# Patient Record
Sex: FEMALE | Race: WHITE
[De-identification: names, ages, dates, MRNs, and addresses within clinical notes are randomized per-mention and may not be internally consistent; named-entity substitution may affect disease eponyms.]

---

## 2017-03-11 ENCOUNTER — HOSPITAL ENCOUNTER (INPATIENT)
Dept: HOSPITAL 25 - ED | Age: 82
LOS: 4 days | Discharge: NURSING, REHAB OR LONG-TERM CARE | DRG: 812 | End: 2017-03-15
Attending: INTERNAL MEDICINE | Admitting: HOSPITALIST
Payer: COMMERCIAL

## 2017-03-11 DIAGNOSIS — F32.9: ICD-10-CM

## 2017-03-11 DIAGNOSIS — I10: ICD-10-CM

## 2017-03-11 DIAGNOSIS — D50.9: Primary | ICD-10-CM

## 2017-03-11 DIAGNOSIS — E03.9: ICD-10-CM

## 2017-03-11 DIAGNOSIS — R23.1: ICD-10-CM

## 2017-03-11 DIAGNOSIS — S70.01XA: ICD-10-CM

## 2017-03-11 DIAGNOSIS — S05.11XA: ICD-10-CM

## 2017-03-11 DIAGNOSIS — S05.12XA: ICD-10-CM

## 2017-03-11 DIAGNOSIS — Z87.440: ICD-10-CM

## 2017-03-11 DIAGNOSIS — F03.91: ICD-10-CM
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
## 2017-03-11 DIAGNOSIS — S50.01XA: ICD-10-CM

## 2017-03-11 DIAGNOSIS — Z91.81: ICD-10-CM

## 2017-03-11 DIAGNOSIS — D63.8: ICD-10-CM

## 2017-03-11 DIAGNOSIS — Z88.3: ICD-10-CM
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
## 2017-03-11 DIAGNOSIS — E04.1: ICD-10-CM

## 2017-03-11 LAB
ALBUMIN SERPL BCG-MCNC: 4.1 G/DL (ref 3.2–5.2)
ALP SERPL-CCNC: 46 U/L (ref 34–104)
ALT SERPL W P-5'-P-CCNC: 12 U/L (ref 7–52)
ANION GAP SERPL CALC-SCNC: 8 MMOL/L (ref 2–11)
AST SERPL-CCNC: 20 U/L (ref 13–39)
BUN SERPL-MCNC: 20 MG/DL (ref 6–24)
BUN/CREAT SERPL: 18.3 (ref 8–20)
CALCIUM SERPL-MCNC: 9.5 MG/DL (ref 8.6–10.3)
CHLORIDE SERPL-SCNC: 100 MMOL/L (ref 101–111)
GLOBULIN SER CALC-MCNC: 3.5 G/DL (ref 2–4)
GLUCOSE SERPL-MCNC: 121 MG/DL (ref 70–100)
HCO3 SERPL-SCNC: 27 MMOL/L (ref 22–32)
HCT VFR BLD AUTO: 34 % (ref 35–47)
HGB BLD-MCNC: 11.1 G/DL (ref 12–16)
MCH RBC QN AUTO: 30 PG (ref 27–31)
MCHC RBC AUTO-ENTMCNC: 33 G/DL (ref 31–36)
MCV RBC AUTO: 90 FL (ref 80–97)
POTASSIUM SERPL-SCNC: 3.9 MMOL/L (ref 3.5–5)
PROT SERPL-MCNC: 7.6 G/DL (ref 6.4–8.9)
RBC # BLD AUTO: 3.74 10^6/UL (ref 4–5.4)
SODIUM SERPL-SCNC: 135 MMOL/L (ref 133–145)
TROPONIN I SERPL-MCNC: 0.01 NG/ML (ref ?–0.04)
TSH SERPL-ACNC: 3.35 MCIU/ML (ref 0.34–5.6)
WBC # BLD AUTO: 9.4 10^3/UL (ref 3.5–10.8)

## 2017-03-11 PROCEDURE — 85025 COMPLETE CBC W/AUTO DIFF WBC: CPT

## 2017-03-11 PROCEDURE — 83550 IRON BINDING TEST: CPT

## 2017-03-11 PROCEDURE — 85018 HEMOGLOBIN: CPT

## 2017-03-11 PROCEDURE — 93005 ELECTROCARDIOGRAM TRACING: CPT

## 2017-03-11 PROCEDURE — 82607 VITAMIN B-12: CPT

## 2017-03-11 PROCEDURE — 36415 COLL VENOUS BLD VENIPUNCTURE: CPT

## 2017-03-11 PROCEDURE — 83605 ASSAY OF LACTIC ACID: CPT

## 2017-03-11 PROCEDURE — 81015 MICROSCOPIC EXAM OF URINE: CPT

## 2017-03-11 PROCEDURE — 71010: CPT

## 2017-03-11 PROCEDURE — 84443 ASSAY THYROID STIM HORMONE: CPT

## 2017-03-11 PROCEDURE — 81003 URINALYSIS AUTO W/O SCOPE: CPT

## 2017-03-11 PROCEDURE — 80053 COMPREHEN METABOLIC PANEL: CPT

## 2017-03-11 PROCEDURE — 85045 AUTOMATED RETICULOCYTE COUNT: CPT

## 2017-03-11 PROCEDURE — 82746 ASSAY OF FOLIC ACID SERUM: CPT

## 2017-03-11 PROCEDURE — 83540 ASSAY OF IRON: CPT

## 2017-03-11 PROCEDURE — 85014 HEMATOCRIT: CPT

## 2017-03-11 PROCEDURE — 82728 ASSAY OF FERRITIN: CPT

## 2017-03-11 PROCEDURE — 72125 CT NECK SPINE W/O DYE: CPT

## 2017-03-11 PROCEDURE — 87641 MR-STAPH DNA AMP PROBE: CPT

## 2017-03-11 PROCEDURE — 84484 ASSAY OF TROPONIN QUANT: CPT

## 2017-03-11 PROCEDURE — 80048 BASIC METABOLIC PNL TOTAL CA: CPT

## 2017-03-11 PROCEDURE — 70450 CT HEAD/BRAIN W/O DYE: CPT

## 2017-03-11 RX ADMIN — SENNOSIDES SCH TAB: 8.6 TABLET, FILM COATED ORAL at 21:21

## 2017-03-11 RX ADMIN — BUPROPION HYDROCHLORIDE SCH MG: 100 TABLET ORAL at 21:21

## 2017-03-11 RX ADMIN — HALOPERIDOL PRN MG: 5 TABLET ORAL at 19:42

## 2017-03-11 RX ADMIN — ENOXAPARIN SODIUM SCH MG: 40 INJECTION SUBCUTANEOUS at 18:33

## 2017-03-11 RX ADMIN — QUETIAPINE SCH MG: 25 TABLET, FILM COATED ORAL at 21:21

## 2017-03-11 RX ADMIN — DIVALPROEX SODIUM SCH MG: 125 TABLET, DELAYED RELEASE ORAL at 21:22

## 2017-03-11 RX ADMIN — ALPRAZOLAM SCH MG: 0.5 TABLET ORAL at 21:21

## 2017-03-11 RX ADMIN — ACETAMINOPHEN PRN MG: 325 TABLET ORAL at 19:42

## 2017-03-11 NOTE — RAD
Indication: Fall, altered mental status, chest pain.



Comparison: May 15, 2008 abdomen CT.



Technique: Single view chest 1412 hours



Report: Elevated lung volumes and mild prominence of the interstitial markings. No

alveolar consolidation, focal pulmonary lesion, pleural effusion, pneumothorax. The heart,

pulmonary vasculature, and mediastinal contours are unremarkable. No rib fractures

evident.



IMPRESSION: Stigmata of probable chronic obstructive pulmonary disease. No acute

cardiopulmonary process evident.

## 2017-03-11 NOTE — ED
Jeremiah GALDAMEZ Janilya, scribed for Clive Pineda MD on 03/11/17 at 1232 .





Adult Trauma





- HPI Summary


HPI Summary: 


An 84 y/o female [facility] c/o several falls in the past few days, 

including an unwitnessed fall that occurred today. Per EMS, pt reports having 

injured her head during one of her falls. In addition, now pt states she has 

increased right elbow and right hip pain. Pt resides in [facility]. When asked, 

she does state she has CP. 





- History of Current Complaint


Chief Complaint: EDExtremityLower


Stated Complaint: FAll


Time Seen by Provider: 03/11/17 12:16


Hx Obtained From: Patient


Mechanism of Injury: Fall


Loss of Consciousness: no loss of consciousness


Onset/Duration: Started Days Ago, Traumatic, Still Present


Onset of Pain: Immediate


Onset Severity: Moderate


Current Severity: Moderate


Pain Intensity: 0


Location: Head, Neck, Chest


Character: Aching


Aggravating Factor(s): Nothing


Alleviating Factor(s): Nothing


Associated Signs & Symptoms: Positive: Chest Pain





- Allergy/Home Medications


Allergies/Adverse Reactions: 


 Allergies











Allergy/AdvReac Type Severity Reaction Status Date / Time


 


Amoxicillin Allergy  Unknown Verified 03/11/16 16:05





   Reaction  





   Details  


 


Clavulanic Acid Allergy  Unknown Verified 03/11/16 16:05





[From Augmentin]   Reaction  





   Details  


 


Yellow Dye Allergy  Unknown Verified 03/11/16 16:05





   Reaction  





   Details  











Home Medications: 


 Home Medications





ALPRAZolam TAB* [Xanax TAB*] 0.5 mg PO TID 03/11/17 [History Confirmed 03/11/17]


Acetaminophen [Acetaminophen Extra Stren] 1,000 mg PO BID 03/11/17 [History 

Confirmed 03/11/17]


Aspirin [Aspirin 81 MG TAB] 81 mg PO DAILY 03/11/17 [History Confirmed 03/11/17]


Cholecalciferol [Vitamin D3] 2,000 unit PO DAILY 03/11/17 [History Confirmed 03/ 11/17]


Cyanocobalamin INJ * [Vitamin B12 INJ *] 1,000 mcg IM MONTHLY 03/11/17 [History 

Confirmed 03/11/17]


Divalproex DR TAB(*) [Depakote DR TAB(*)] 375 mg PO TID 03/11/17 [History 

Confirmed 03/11/17]


Levothyroxine TAB* [Synthroid TAB*] 50 mcg PO DAILY 03/11/17 [History Confirmed 

03/11/17]


Miralax* 17 gm PO DAILY 03/11/17 [History Confirmed 03/11/17]


QUEtiapine TAB* [Seroquel TAB*] 50 mg PO DAILY 03/11/17 [History Confirmed 03/11 /17]


QUEtiapine TAB* [Seroquel TAB*] 75 mg PO BEDTIME 03/11/17 [History Confirmed 03/ 11/17]


Senna TAB* [Senokot TAB*] 8.6 mg PO BEDTIME 03/11/17 [History Confirmed 03/11/17

]


buPROPion TAB* [Wellbutrin TAB*] 100 mg PO TID 03/11/17 [History Confirmed 03/11 /17]











PMH/Surg Hx/FS Hx/Imm Hx


Previously Healthy: Yes


Endocrine/Hematology History: Reports: Hx Thyroid Disease - HYPO


Cardiovascular History: Reports: Hx Hypertension





- Surgical History


Surgery Procedure, Year, and Place: TOTAL HYSTERECTOMY


Infectious Disease History: No


Infectious Disease History: Reports: Hx Shingles


   Denies: Traveled Outside the [place] in Last 30 Days





- Family History


Known Family History: Positive: Unknown


Family History: Pt is a poor historian.





- Social History


Occupation: Retired


Lives: [facility]


Alcohol Use: None


Substance Use Type: Reports: None


Smoking Status (MU): Never Smoked Tobacco





Review of Systems


Negative: Fever, Chills


Negative: Erythema


Negative: Sore Throat


Positive: Chest Pain


Negative: Shortness Of Breath, Cough


Negative: Abdominal Pain, Vomiting, Diarrhea, Nausea


Negative: dysuria, hematuria


Positive: Arthralgia - neck pain as well as R hip and R elbow pain, Myalgia - 

neck pain as well as R hip and R elbow pain.  Negative: Edema


Negative: Rash


Neurological: Negative - pt denies dizziness


All Other Systems Reviewed And Are Negative: Yes





Physical Exam





- Summary


Physical Exam Summary: 





Constitutional: Well-developed, Well-nourished, Alert, Cooperative, Smell of 

Urine


Skin: Warm, Dry


HENT: Normocephalic; No Racoons eyes; No battles sign; No abrasion; No contusion

; No hemotympanum; No maxilla facial tenderness or instability; Dentition are 

smooth; No dental trauma; No trismus


Eyes: EOM normal, PERRL


Neck: Trachea is midline. No stridor; No JVD; No step off; No posterior 

cervical spine tenderness


Cardio: Rhythm regular, rate normal Heart sounds normal; Intact distal pulses; 

The pedal pulses are 2+ and symmetric. Radial pulses are 2+ and symmetric.


Pulmonary/Chest wall: Effort normal; Breath sounds normal; Equal chest rise; No 

flail segment; No rib tenderness; No sternal tenderness


Abd: Soft, Appearance normal. No distension; No tenderness; No palpable 

pulsatile mass; No Cullens sign; No Grey-Turners sign


Musculoskeletal: Full ROM and no tenderness at ankles, shoulders, and knees; 

Big yellow hematoma and tenderness to palpation over right hip; Hematoma and 

tenderness over right elbow, left frontal scalp, right posterior scalp, 

cervical spine;, No joint swelling; No vertebral body tenderness; No paraspinal 

tenderness; No step off or deformity of the spine; Pelvis is stable to lateral 

compression and rock


Neuro: Alert, Oriented x3, Strength 5/5 all extremities.


: No blood at urethral meatus


Psych: Mood and affect Normal





Triage Information Reviewed: Yes


Vital Signs On Initial Exam: 


 Initial Vitals











Temp Pulse Resp BP Pulse Ox


 


 98.2 F   84   16   120/57   95 


 


 03/11/17 12:17  03/11/17 12:17  03/11/17 12:17  03/11/17 12:17  03/11/17 12:17











Vital Signs Reviewed: Yes





Diagnostics





- Vital Signs


 Vital Signs











  Temp Pulse Resp BP Pulse Ox


 


 03/11/17 12:17  98.2 F  84  16  120/57  95














- Laboratory


Result Diagrams: 


 03/11/17 12:40





 03/11/17 12:40


Lab Statement: Any lab studies that have been ordered have been reviewed, and 

results considered in the medical decision making process.





- Radiology


  ** CXR


Xray Interpretation: No Acute Changes - IMPRESSION: No acute disease


Radiology Interpretation Completed By: ED Physician - Dr. Pineda





  ** hip/pelvis


Xray Interpretation: No Acute Changes - No acute disease.


Radiology Interpretation Completed By: ED Physician





  ** elbow


Xray Interpretation: No Acute Changes - No acute disease


Radiology Interpretation Completed By: ED Physician





- CT


  ** cervical spine


CT Interpretation: No Acute Changes - IMPRESSION: Negative for traumatic 

cervical spine injury.


CT Interpretation Completed By: Radiologist





  ** brain


CT Interpretation: Positive (See Comments) - IMPRESSION:  1. Mild RIGHT 

parietal scalp swelling. Negative for fracture or traumatic brain injury. 2. 

Involutional change and stigmata of chronic microvascular disease. No acute 

intracranial process evident.


CT Interpretation Completed By: Radiologist





- EKG


  ** 1307


Cardiac Rate: NL - 84 bpm


EKG Rhythm: Sinus Rhythm


Ectopy: None


EKG Interpretation: No STEMI





Re-Evaluation





- Re-Evaluation


  ** First Eval


Re-Evaluation Time: 14:50


Change: Unchanged


Comment: Pt is agitated and yelling.





Adult Trauma Course/Dx





- Course


Assessment/Plan: An 83-year-old female presents to the ED with a CC of frequent 

falls in the past few days. Pt c/o R elbow and R hip pain, however, xrays of 

these areas are negative. EKG is normal. Pt is mildly confused and a poor 

historian. We discussed patient care with Dr. Mueller and they recommended 

admission. Patient will be admitted for UTI and contusion. Pt is agreeable with 

this plan.





- Diagnoses


Differential Diagnosis/HQI/PQRI: Positive: Contusion(s), Fracture, Dislocation, 

Hematoma(s), Sprain, Strain


Provider Diagnoses: 


 UTI (urinary tract infection), Agitation, Frequent falls, Elbow contusion, 

Contusion of hip, Scalp hematoma








- Physician Notifications


Discussed Care Of Patient With: Dr. Mueller (hospitalist) at 1502: agrees to 

admit pt.





Discharge





- Discharge Plan


Condition: Stable


Disposition: ADMITTED TO [facility] documentation as recorded by the Jeremiah remy Janilya accurately 

reflects the service I personally performed and the decisions made by me, Clive Pineda MD.

## 2017-03-11 NOTE — RAD
Indication: Fall, hematoma.



Comparison: May 15, 2008 CT.



Technique: AP pelvis and AP and crosstable lateral views RIGHT hip.



Report: The RIGHT hip is normally located. No fracture of the RIGHT proximal femur or

pelvis or pelvic joint diastases evident. Preserved hip joint spaces with only minimal

acetabular marginal osteophytosis.  Soft tissue edema within the subcutaneous tissue plane

lateral to the RIGHT hip.



IMPRESSION: 

1. Soft tissue edema within the subcutaneous tissue plane lateral to the RIGHT hip.

2. No radiographic evidence for RIGHT hip fracture. As x-rays may be negative with

nondisplaced hip fracture if there is persistent clinical concern MRI or in setting of

contraindication to MRI or limitation in emergent access to MRI CT would be suggested.

## 2017-03-11 NOTE — RAD
INDICATION: Bruising to the RIGHT elbow post fall. Preserved range of motion.



COMPARISON: None.



TECHNIQUE: AP, lateral, and oblique views RIGHT elbow.



REPORT:  Normal articular alignment. Negative for fat pad displacement to indicate

effusion. Negative for acute fracture. Internal fixation hardware at both the radius and

ulna diaphyses partially visualized. Small triceps insertion olecranon bone spur. Mild

dorsal soft tissue swelling.



IMPRESSION: Mild dorsal soft tissue swelling. Negative for joint effusion or fracture.

## 2017-03-11 NOTE — RAD
Indication: Fall, altered mental status.



Comparison: August 25, 2005 MRI.



Technique: Noncontrast CT vertex of skull through foramen magnum.



Report: Moderate prominence of the cerebral sulci and cerebellar fissures reflecting

atrophy. Unremarkable ventricles and basal cisterns. Decreased density in the

periventricular and subcortical white matter while non-specific is most likely due to

chronic microangiopathy. Negative for gray matter white matter obscuration, intra or

extra-axial hemorrhage, or mass effect. Negative for orbital hematoma.



Negative for calvarial or skull base fracture. Mild mucosal thickening at the maxillary

and ethmoid sinuses. Negative for paranasal sinus fluid levels. Clear mastoid air spaces.

Mild RIGHT parietal scalp edema. Negative for significant loculated scalp hematoma.



IMPRESSION: 

1. Mild RIGHT parietal scalp swelling. Negative for fracture or traumatic brain injury.

2. Involutional change and stigmata of chronic microvascular disease. No acute

intracranial process evident.

## 2017-03-12 RX ADMIN — DIVALPROEX SODIUM SCH MG: 125 CAPSULE ORAL at 20:26

## 2017-03-12 RX ADMIN — ENOXAPARIN SODIUM SCH MG: 40 INJECTION SUBCUTANEOUS at 15:43

## 2017-03-12 RX ADMIN — DIVALPROEX SODIUM SCH MG: 125 TABLET, DELAYED RELEASE ORAL at 07:43

## 2017-03-12 RX ADMIN — BUPROPION HYDROCHLORIDE SCH MG: 100 TABLET ORAL at 12:09

## 2017-03-12 RX ADMIN — ALPRAZOLAM SCH MG: 0.5 TABLET ORAL at 20:25

## 2017-03-12 RX ADMIN — ASPIRIN SCH MG: 81 TABLET, COATED ORAL at 07:43

## 2017-03-12 RX ADMIN — POLYETHYLENE GLYCOL 3350 SCH GM: 17 POWDER, FOR SOLUTION ORAL at 07:43

## 2017-03-12 RX ADMIN — QUETIAPINE SCH MG: 25 TABLET, FILM COATED ORAL at 20:26

## 2017-03-12 RX ADMIN — HALOPERIDOL PRN MG: 5 TABLET ORAL at 06:28

## 2017-03-12 RX ADMIN — ALPRAZOLAM SCH MG: 0.5 TABLET ORAL at 12:09

## 2017-03-12 RX ADMIN — ACETAMINOPHEN PRN MG: 325 TABLET ORAL at 06:28

## 2017-03-12 RX ADMIN — DIVALPROEX SODIUM SCH MG: 125 CAPSULE ORAL at 12:09

## 2017-03-12 RX ADMIN — QUETIAPINE SCH MG: 25 TABLET, FILM COATED ORAL at 07:42

## 2017-03-12 RX ADMIN — BUPROPION HYDROCHLORIDE SCH MG: 100 TABLET ORAL at 20:26

## 2017-03-12 RX ADMIN — ALPRAZOLAM SCH MG: 0.5 TABLET ORAL at 07:43

## 2017-03-12 RX ADMIN — SENNOSIDES SCH TAB: 8.6 TABLET, FILM COATED ORAL at 20:26

## 2017-03-12 RX ADMIN — BUPROPION HYDROCHLORIDE SCH MG: 100 TABLET ORAL at 07:43

## 2017-03-12 RX ADMIN — LEVOTHYROXINE SODIUM SCH MCG: 50 TABLET ORAL at 05:48

## 2017-03-12 RX ADMIN — CEFTRIAXONE SODIUM SCH MLS/HR: 1 INJECTION, POWDER, FOR SOLUTION INTRAMUSCULAR; INTRAVENOUS at 15:43

## 2017-03-12 NOTE — PN
Subjective


Date of Service: 03/12/17


Interval History: 





Patient seen this morning. She remains confused, she is more alert and makes 

eye contact. Mostly mumbles. Some pain in RLE. Did not eat much breakfast. 


Family History: Unchanged from Admission


Social History: Unchanged from Admission


Past Medical History: Unchanged from Admission





Objective


Active Medications: 








Acetaminophen (Tylenol Tab*)  650 mg PO Q4H PRN


Alprazolam (Xanax Tab*)  0.5 mg PO TID [facility]


Aspirin (Aspirin Ec Low Dose*)  81 mg PO DAILY JERRICA


Bupropion HCl (Wellbutrin Tab*)  100 mg PO TID JERRICA


Cyanocobalamin (Vitamin B12 Inj *)  1,000 mcg IM MONTHLY JERRICA


Divalproex Sodium (Depakote Dr Tab(*))  375 mg PO TID JERRICA


Enoxaparin Sodium (Lovenox(*))  40 mg SUBCUT Q24H JERRICA


Haloperidol (Haldol Tab*)  5 mg PO Q6H PRN


Sodium Chloride (Ns 0.9% 1000 Ml*)  1,000 mls @ 75 mls/hr IV PER RATE JERRICA


Ceftriaxone Sodium 1,000 mg/ (Sodium Chloride)  50 mls @ 200 mls/hr IVPB Q24H 

JERRICA


Levothyroxine Sodium (Synthroid Tab*)  50 mcg PO DAILY@0600 JERRICA


Nystatin (Nystatin Top Powder*)  1 applic TOPICAL Q2H PRN


Polyethylene Glycol/Electrolytes (Miralax*)  17 gm PO DAILY [facility]


Quetiapine Fumarate (Seroquel Tab*)  75 mg PO BEDTIME JERRICA


Quetiapine Fumarate (Seroquel Tab*)  50 mg PO DAILY [facility]


Senna (Senokot Tab*)  1 tab PO BEDTIME [facility]





 Vital Signs











  03/11/17 03/11/17 03/11/17





  15:45 16:00 16:22


 


Temperature 98.1 F  


 


Pulse Rate 75 85 83


 


Respiratory 16  





Rate   


 


Blood Pressure 130/92  143/93





(mmHg)   


 


O2 Sat by Pulse 99 93 95





Oximetry   














  03/12/17 03/12/17 03/12/17





  07:43 08:17 08:55


 


Temperature  97.1 F 


 


Pulse Rate  94 


 


Respiratory 16  18





Rate   


 


Blood Pressure  134/64 





(mmHg)   


 


O2 Sat by Pulse  95 





Oximetry   











Oxygen Devices in Use Now: None


Appearance: Elderly,  F, laying in bed, slightly restless


Eyes: No Scleral Icterus, - - Raccoon eyes


Ears/Nose/Mouth/Throat: - - Dry Mm


Neck: NL Appearance and Movements; NL JVP


Respiratory: Symmetrical Chest Expansion and Respiratory Effort, Clear to 

Auscultation


Cardiovascular: NL Sounds; No Murmurs; No JVD, - - Tachycardic


Abdominal: NL Sounds; No Tenderness; No Distention


Lymphatic: No Cervical Adenopathy


Extremities: - - R thigh with ecchymosis and edema, TTP 


Neurological: - - Alert, confused, mumbles


Lines/Tubes/Other Access: Clean, Dry and Intact Nicole


Result Diagrams: 


 03/11/17 12:40





 03/11/17 12:40


Microbiology and Other Data: 


 Microbiology











 03/12/17 04:00 Nasal Screen MRSA (PCR)(BRIAN) - Final





 Nasal    Mrsa Negative














Assess/Plan/Problems-Billing


Assessment: 


Recurrent falls and acute on chronic dementia 2/2 UTI in an 82 yo F with hx of 

HTN, depression, hypothyroidism








- Patient Problems


(1) UTI (urinary tract infection)


Current Visit: Yes   Comment: Continue IV CTX and light IVF. Mental status 

seems improved today. Remove nicole. Awaiting culture results. Will try to 

ambulate patient today.    





(2) Dementia


Current Visit: Yes   Comment: Continue prn Haldol and home medications   





(3) Hypothyroidism


Current Visit: Yes   Comment: Continue synthroid   





(4) Thyroid cyst


Current Visit: Yes   Comment: TSH normal. Further workup as outpatient   





(5) Depression


Current Visit: Yes   Comment: Continue home medications   





(6) DVT prophylaxis


Current Visit: Yes   Comment: Lovenox

## 2017-03-12 NOTE — HP
CC:  Dr. Filiberto Mi

 

HISTORY AND PHYSICAL:

 

DATE OF ADMISSION:  03/11/17

 

PRIMARY CARE PHYSICIAN:  Dr. Filiberto Mi.

 

CHIEF COMPLAINT:  Recurrent falls.

 

HISTORY OF PRESENT ILLNESS:  Ms. Cash is an 83-year-old female with a past medical history of hype
rtension, dementia with behavioral disturbances, depression, hypothyroidism, recurrent UTIs, who pre
sents to the hospital from [facility] where she lives with recurrent falls.  The patient has signific
ant dementia and also just received Ativan and is unable to contribute to history at this time.  The
 patient's daughter is present.  History obtained from the daughter and the notes from [facility].  S
he said the patient has had progressive falls over the past 24 hours. Her daughter states that she h
as moved to [facility] back in November after she was at [place].  Her daughter has overall not been p
leased with the care at [facility] and feels that the staff did not pay enough attention to her mothe
r.  She is often found lying on the ground in her room; however, this has been happening more freque
ntly and now 3 times in the past 24 hours.  Yesterday, the daughter was called when the patient was 
found on the floor in her room.  She was complaining of some hip pain.  She underwent an x-ray of th
e hip that was negative.  Later on the day, the daughter was called again that the patient was found
 on the ground and then again this morning.  She has been having significant bruises from these fall
s, although I am not clear if any significant fall has been witnessed.  It sounds like the patient carolyn medrano slides down to the ground; however, due to her recurrent falls and bruising, the patient was s
ent to the hospital for further evaluation.

 

The daughter states that the patient has fairly advanced dementia at this point. She states she stil
l will recognize family.  She is not verbal for the most part and mostly mumbles.  She is fairly agg
ressive and has been for some time now.  The patient ambulates without any assistance and does not u
se a cane or a walker. Daughter states that the patient is prone to urinary tract infections.

 

PAST MEDICAL HISTORY:  Hypertension, depression, dementia, hypothyroidism, UTIs.

 

PAST SURGICAL HISTORY:  Hysterectomy, arm surgery.

 

ALLERGIES:  AMOXICILLIN, CLAVULANIC ACID, YELLOW DYE.

 

FAMILY HISTORY:  Significant for two brothers with CAD.  Mother with cancer. Sister with breast canc
er.

 

SOCIAL HISTORY:  No history of drug use.  No alcohol use.  No tobacco use.  The patient is a residen
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

 

REVIEW OF SYSTEMS:  Unable to obtain from the patient.

 

                               PHYSICAL EXAMINATION

 

GENERAL:  The patient is an elderly  female lying in bed, in no apparent distress, somewhat
 lethargic.

 

VITAL SIGNS:  On admission, temperature 98.2, heart rate of 84, respiratory rate of 16, O2 saturatio
n 95% on room air, blood pressure 120/57.

 

HEENT:  The patient has significant bruising on the left forehead and raccoon eyes. Refused to open 
her mouth.

 

NECK:  No cervical adenopathy noted.

 

LUNGS:  Clear to auscultation in anterior fields.  No wheezes, rales, or rhonchi.

 

CARDIOVASCULAR:  Regular rate and rhythm.  S1, S2 present.  No murmurs, gallops, or rubs.

 

ABDOMEN:  Soft, nontender, nondistended.  Bowel sounds positive.

 

EXTREMITIES:  No cyanosis, clubbing, or edema.  The patient with some ecchymoses on the right mid th
igh which is tender to palpation.

 

NEURO:  The patient is lethargic.  Her answers are incomprehensible.

 

 DIAGNOSTIC STUDIES/LAB DATA:  White blood cell count of 9.4, hematocrit of 34, platelets of 193.  S
odium of 135, potassium 3.9, chloride of 100, carbon dioxide 27, BUN of 20, creatinine is 1.09, gluc
ose of 121, lactic acid of 1.9.  LFTs within normal limits.  Troponin 0.01.  UA with 2+ protein, tra
ce ketones, 1+ blood, 3+ wbc's, 1+ rbc's, squamous epithelial cells present.

 

CT of the brain showed some right-sided scalp swelling; however, no intracranial changes.

 

CT of the C-spine shows no injury.

 

Chest x-ray personally reviewed shows some hyperinflated lungs, no focal consolidations.

 

Elbow x-ray shows dorsal soft tissue swelling.

 

Hip and pelvis x-ray shows soft tissue edema, no fractures.

 

EKG shows normal sinus rhythm with a QTc of 462.

 

ASSESSMENT AND PLAN:  Recurrent falls and possible exacerbation of the patient's underlying aggressi
ve dementia in an 83-year-old female with a past medical history of hypertension, depression, hypoth
yroidism, and recurrent urinary tract infections.

 

1.  Recurrent falls and aggressive behavior, maybe secondary to urinary tract infection.  Daughter s
tates the patient is prone to this.  Unable to get an adequate history of any urinary symptoms from 
the patient.  She received ceftriaxone in the emergency department.  We will continue this at 1 g da
nikita for now.  Continue the patient's home Seroquel and we will also add Haldol p.r.n. for agitation.
  The patient has numerous ecchymoses; however, no fractures are noted on imaging.  We will hold on 
any additional imaging for now.  We will continue IV fluids at 100 cc per hour.

2.  Depression:  Continue home Wellbutrin and Depakote.

3.  Hypothyroidism:  Continue home Synthroid.

4.  History of thyroid cyst:  Daughter states the patient was scheduled to have an ultrasound soon o
f her thyroid to follow up on a cyst.  This can be worked up as an outpatient.  I will add a TSH on 
to the patient's labs to see if she needs any adjustment in her medication.

5.  DVT prophylaxis:  Lovenox subcu.

6.  Code status:  The patient is a full code as per [facility] form.

 

TIME SPENT:  Total time spent on this admission 45 minutes, more than half the time spent face-to-fa
ce with the patient in counseling and coordinating care.

 

 

 

[id number]/[facility] #: [id number]

## 2017-03-13 LAB
ANION GAP SERPL CALC-SCNC: 6 MMOL/L (ref 2–11)
BUN SERPL-MCNC: 13 MG/DL (ref 6–24)
BUN/CREAT SERPL: 14.8 (ref 8–20)
CALCIUM SERPL-MCNC: 8.3 MG/DL (ref 8.6–10.3)
CHLORIDE SERPL-SCNC: 107 MMOL/L (ref 101–111)
GLUCOSE SERPL-MCNC: 80 MG/DL (ref 70–100)
HCO3 SERPL-SCNC: 22 MMOL/L (ref 22–32)
HCT VFR BLD AUTO: 27 % (ref 35–47)
HGB BLD-MCNC: 9 G/DL (ref 12–16)
POTASSIUM SERPL-SCNC: 3.8 MMOL/L (ref 3.5–5)
SODIUM SERPL-SCNC: 135 MMOL/L (ref 133–145)

## 2017-03-13 RX ADMIN — BUPROPION HYDROCHLORIDE SCH MG: 100 TABLET ORAL at 21:27

## 2017-03-13 RX ADMIN — QUETIAPINE SCH MG: 25 TABLET, FILM COATED ORAL at 21:27

## 2017-03-13 RX ADMIN — DIVALPROEX SODIUM SCH MG: 125 CAPSULE ORAL at 21:27

## 2017-03-13 RX ADMIN — DIVALPROEX SODIUM SCH MG: 125 CAPSULE ORAL at 09:07

## 2017-03-13 RX ADMIN — ASPIRIN SCH MG: 81 TABLET, COATED ORAL at 09:06

## 2017-03-13 RX ADMIN — QUETIAPINE SCH MG: 25 TABLET, FILM COATED ORAL at 09:04

## 2017-03-13 RX ADMIN — CEFTRIAXONE SODIUM SCH MLS/HR: 1 INJECTION, POWDER, FOR SOLUTION INTRAMUSCULAR; INTRAVENOUS at 15:08

## 2017-03-13 RX ADMIN — LEVOTHYROXINE SODIUM SCH MCG: 50 TABLET ORAL at 06:02

## 2017-03-13 RX ADMIN — SENNOSIDES SCH TAB: 8.6 TABLET, FILM COATED ORAL at 21:27

## 2017-03-13 RX ADMIN — DIVALPROEX SODIUM SCH MG: 125 CAPSULE ORAL at 15:09

## 2017-03-13 RX ADMIN — BUPROPION HYDROCHLORIDE SCH MG: 100 TABLET ORAL at 09:06

## 2017-03-13 RX ADMIN — POLYETHYLENE GLYCOL 3350 SCH GM: 17 POWDER, FOR SOLUTION ORAL at 09:06

## 2017-03-13 RX ADMIN — ALPRAZOLAM SCH MG: 0.5 TABLET ORAL at 09:05

## 2017-03-13 RX ADMIN — BUPROPION HYDROCHLORIDE SCH MG: 100 TABLET ORAL at 15:08

## 2017-03-13 RX ADMIN — ALPRAZOLAM SCH MG: 0.5 TABLET ORAL at 15:09

## 2017-03-13 RX ADMIN — ENOXAPARIN SODIUM SCH MG: 40 INJECTION SUBCUTANEOUS at 17:06

## 2017-03-13 RX ADMIN — ALPRAZOLAM SCH MG: 0.5 TABLET ORAL at 21:27

## 2017-03-13 NOTE — PN
Subjective


Date of Service: 03/13/17


Interval History: 





Patient seen and examined at bedside. Patient was very sleepy but arousable. 

She mostly mumbles and swats my hands away during the assessment. I cannot 

elicit any complaints from her. Nursing expressed concern that the patient had 

periods of hypotension overnight and appeared more pale. 








Family History: Unchanged from Admission


Social History: Unchanged from Admission


Past Medical History: Unchanged from Admission





Objective


Active Medications: 








Acetaminophen (Tylenol Tab*)  650 mg PO Q4H PRN


   PRN Reason: FEVER/PAIN


   Last Admin: 03/11/17 19:42 Dose:  650 mg


Alprazolam (Xanax Tab*)  0.5 mg PO TID [facility]


   Last Admin: 03/13/17 09:05 Dose:  0.5 mg


Aspirin (Aspirin Ec Low Dose*)  81 mg PO DAILY [facility]


   Last Admin: 03/13/17 09:06 Dose:  81 mg


Bupropion HCl (Wellbutrin Tab*)  100 mg PO TID [facility]


   Last Admin: 03/13/17 09:06 Dose:  100 mg


Cyanocobalamin (Vitamin B12 Inj *)  1,000 mcg IM MONTHLY [facility]


Divalproex Sodium (Depakote Sprinkle Cap*)  375 mg PO TID [facility]


   Last Admin: 03/13/17 09:07 Dose:  375 mg


Enoxaparin Sodium (Lovenox(*))  40 mg SUBCUT Q24H [facility]


   Last Admin: 03/12/17 15:43 Dose:  40 mg


Haloperidol (Haldol Tab*)  5 mg PO Q6H PRN


   PRN Reason: AGITATION


   Last Admin: 03/11/17 19:42 Dose:  5 mg


Sodium Chloride (Ns 0.9% 1000 Ml*)  1,000 mls @ 75 mls/hr IV PER RATE [facility]


   Last Admin: 03/12/17 07:56 Dose:  75 mls/hr


Ceftriaxone Sodium 1,000 mg/ (Sodium Chloride)  50 mls @ 200 mls/hr IVPB Q24H 

[facility]


   Last Admin: 03/12/17 15:43 Dose:  200 mls/hr


Levothyroxine Sodium (Synthroid Tab*)  50 mcg PO DAILY@0600 [facility]


   Last Admin: 03/13/17 06:02 Dose:  50 mcg


Nystatin (Nystatin Top Powder*)  1 applic TOPICAL Q2H PRN


   PRN Reason: RASH


Polyethylene Glycol/Electrolytes (Miralax*)  17 gm PO DAILY [facility]


   Last Admin: 03/13/17 09:06 Dose:  17 gm


Quetiapine Fumarate (Seroquel Tab*)  75 mg PO BEDTIME [facility]


   Last Admin: 03/12/17 20:26 Dose:  75 mg


Quetiapine Fumarate (Seroquel Tab*)  50 mg PO DAILY [facility]


   Last Admin: 03/13/17 09:04 Dose:  50 mg


Senna (Senokot Tab*)  1 tab PO BEDTIME [facility]


   Last Admin: 03/12/17 20:26 Dose:  1 tab








 Vital Signs











  03/12/17 03/12/17 03/12/17





  11:48 12:09 14:09


 


Temperature 99.3 F  


 


Pulse Rate 87  


 


Respiratory  16 18





Rate   


 


Blood Pressure 117/58  





(mmHg)   


 


O2 Sat by Pulse 93  





Oximetry   














  03/12/17 03/12/17 03/12/17





  15:40 20:00 20:25


 


Temperature 97.2 F  


 


Pulse Rate 104  


 


Respiratory 16 20 12





Rate   


 


Blood Pressure 152/63  





(mmHg)   


 


O2 Sat by Pulse 92  





Oximetry   














  03/12/17 03/12/17 03/12/17





  21:51 21:58 22:25


 


Temperature 98.6 F  


 


Pulse Rate 101 103 


 


Respiratory 16 16 20





Rate   


 


Blood Pressure 79/38 95/37 





(mmHg)   


 


O2 Sat by Pulse 91 92 





Oximetry   














  03/12/17 03/13/17 03/13/17





  22:59 00:20 03:11


 


Temperature   98.8 F


 


Pulse Rate 94 89 87


 


Respiratory 22 20 16





Rate   


 


Blood Pressure 98/42 100/49 132/56





(mmHg)   


 


O2 Sat by Pulse 93 93 95





Oximetry   














  03/13/17 03/13/17 03/13/17





  03:25 07:39 09:05


 


Temperature  97.8 F 


 


Pulse Rate 84 78 


 


Respiratory 18 16 24





Rate   


 


Blood Pressure 125/54 114/62 





(mmHg)   


 


O2 Sat by Pulse 92 91 





Oximetry   











Oxygen Devices in Use Now: None


Appearance: Elderly female, lying in bed, in NAD


Eyes: No Scleral Icterus, - - bilateral periorbital ecchymosis 


Ears/Nose/Mouth/Throat: Clear Oropharnyx


Neck: NL Appearance and Movements; NL JVP


Respiratory: Symmetrical Chest Expansion and Respiratory Effort, Clear to 

Auscultation


Cardiovascular: NL Sounds; No Murmurs; No JVD, RRR


Abdominal: NL Sounds; No Tenderness; No Distention


Extremities: - - right thigh ecchymosis


Skin: - - with some pallor - unsure of baseline


Neurological: - - oriented to self


Nutrition: Taking PO's


Result Diagrams: 


 03/13/17 13:29





 03/13/17 13:29


Microbiology and Other Data: 


 Microbiology











 03/12/17 04:00 Nasal Screen MRSA (PCR)(BRIAN) - Final





 Nasal    Mrsa Negative














Assess/Plan/Problems-Billing


Assessment: 


Recurrent falls and acute on chronic dementia 2/2 UTI in an 82 yo F with hx of 

HTN, depression, hypothyroidism








- Patient Problems


(1) UTI (urinary tract infection)


Comment: 


Continue IV CTX and light IVF. 


Awaiting culture results. Encourage OOB.   





(2) Dementia


Code(s): F03.90 - UNSPECIFIED DEMENTIA WITHOUT BEHAVIORAL DISTURBANCE   Comment

: 


Continue prn Haldol and home medications   





(3) Pallor


Code(s): R23.1 - PALLOR   Comment: 


Unsure of baseline. Previous hypotension, now normotensive.


Will check HH, stool occult.


Recheck CBC in AM.    





(4) Hypothyroidism


Code(s): E03.9 - HYPOTHYROIDISM, UNSPECIFIED   Comment: 


Continue synthroid   





(5) Thyroid cyst


Code(s): E04.1 - NONTOXIC SINGLE THYROID NODULE   Comment: 


TSH normal. Further workup as outpatient   





(6) Depression


Code(s): F32.9 - MAJOR DEPRESSIVE DISORDER, SINGLE EPISODE, UNSPECIFIED   

Comment: 


Continue home medications   





(7) DVT prophylaxis


Code(s): WHS0655 -    Comment: 


Lovenox   


Status and Disposition: 





Inpatient admission. VSS and patient more alert later in day. Plan to stop IVF 

and try to ambulate pt in AM. Recheck CBC. Anticipate d/c tomorrow.

## 2017-03-14 LAB
FERRITIN SERPL IA-MCNC: 144.3 NG/ML (ref 11–307)
FOLATE SERPL-MCNC: 14.74 NG/ML (ref 3.99–?)
HCT VFR BLD AUTO: 26 % (ref 35–47)
HGB BLD-MCNC: 8.9 G/DL (ref 12–16)
IRON SERPL-MCNC: < 15 UG/DL (ref 50–212)
MATURATION FACTOR RETIC: 2
MCH RBC QN AUTO: 30 PG (ref 27–31)
MCHC RBC AUTO-ENTMCNC: 34 G/DL (ref 31–36)
MCV RBC AUTO: 90 FL (ref 80–97)
RBC # BLD AUTO: 2.94 10^6/UL (ref 4–5.4)
RETICULOCYTE PRODUCTION INDEX: 1.1 % (ref 0.5–1.5)
TIBC SERPL-MCNC: 276 MCG/DL (ref 250–450)
TRANSFERRIN SERPL-MCNC: 197 MG/DL (ref 203–362)
VIT B12 SERPL-MCNC: 698 PG/ML (ref 180–914)
WBC # BLD AUTO: 9.1 10^3/UL (ref 3.5–10.8)

## 2017-03-14 RX ADMIN — BUPROPION HYDROCHLORIDE SCH MG: 100 TABLET ORAL at 09:33

## 2017-03-14 RX ADMIN — LEVOTHYROXINE SODIUM SCH MCG: 50 TABLET ORAL at 06:18

## 2017-03-14 RX ADMIN — BUPROPION HYDROCHLORIDE SCH MG: 100 TABLET ORAL at 14:54

## 2017-03-14 RX ADMIN — SENNOSIDES SCH TAB: 8.6 TABLET, FILM COATED ORAL at 21:47

## 2017-03-14 RX ADMIN — ENOXAPARIN SODIUM SCH MG: 40 INJECTION SUBCUTANEOUS at 15:30

## 2017-03-14 RX ADMIN — GUAIFENESIN SCH MG: 600 TABLET, EXTENDED RELEASE ORAL at 21:48

## 2017-03-14 RX ADMIN — GUAIFENESIN SCH: 600 TABLET, EXTENDED RELEASE ORAL at 13:36

## 2017-03-14 RX ADMIN — ALPRAZOLAM SCH: 0.5 TABLET ORAL at 09:27

## 2017-03-14 RX ADMIN — BUPROPION HYDROCHLORIDE SCH MG: 100 TABLET ORAL at 21:47

## 2017-03-14 RX ADMIN — QUETIAPINE SCH MG: 25 TABLET, FILM COATED ORAL at 21:47

## 2017-03-14 RX ADMIN — CEFTRIAXONE SODIUM SCH MLS/HR: 1 INJECTION, POWDER, FOR SOLUTION INTRAMUSCULAR; INTRAVENOUS at 15:26

## 2017-03-14 RX ADMIN — ASPIRIN SCH MG: 81 TABLET, COATED ORAL at 09:33

## 2017-03-14 RX ADMIN — QUETIAPINE SCH MG: 25 TABLET, FILM COATED ORAL at 09:33

## 2017-03-14 RX ADMIN — DIVALPROEX SODIUM SCH MG: 125 CAPSULE ORAL at 09:33

## 2017-03-14 RX ADMIN — DIVALPROEX SODIUM SCH MG: 125 CAPSULE ORAL at 21:47

## 2017-03-14 RX ADMIN — POLYETHYLENE GLYCOL 3350 SCH GM: 17 POWDER, FOR SOLUTION ORAL at 09:33

## 2017-03-14 RX ADMIN — DIVALPROEX SODIUM SCH MG: 125 CAPSULE ORAL at 14:46

## 2017-03-14 NOTE — PN
Subjective


Date of Service: 03/14/17


Interval History: 





Patient seen and examined at bedside. Patient remains sleepy but arousable. I 

did attempt to obtain a stool specimen but there is no stool available for 

sample in the rectal vault. Patient was able to push herself up and bear weight 

on her legs with nursing assist. No other verbalized complaints. Patient is 

minimally verbal at baseline. 





I spoke with patient's daughter, Paige, who reports that the patient does 

ambulate without a walker. However, the patient often hunches over and does not 

stand up straight when she walks. It is unclear if her falls have been 

mechanical in nature or if the patient simply becomes fatigued and falls. 

Patient has not been cooperative with staff here to stand or transfer. 








Family History: Unchanged from Admission


Social History: Unchanged from Admission


Past Medical History: Unchanged from Admission





Objective


Active Medications: 








Acetaminophen (Tylenol Tab*)  650 mg PO Q4H PRN


   PRN Reason: FEVER/PAIN


   Last Admin: 03/11/17 19:42 Dose:  650 mg


Alprazolam (Xanax Tab*)  0.5 mg PO TID [facility]


   PRN Reason: AGITATION/ANXIETY


Aspirin (Aspirin Ec Low Dose*)  81 mg PO DAILY [facility]


   Last Admin: 03/14/17 09:33 Dose:  81 mg


Bupropion HCl (Wellbutrin Tab*)  100 mg PO TID [facility]


   Last Admin: 03/13/17 21:27 Dose:  100 mg


Cyanocobalamin (Vitamin B12 Inj *)  1,000 mcg IM MONTHLY [facility]


Divalproex Sodium (Depakote Sprinkle Cap*)  375 mg PO TID [facility]


   Last Admin: 03/13/17 21:27 Dose:  375 mg


Enoxaparin Sodium (Lovenox(*))  40 mg SUBCUT Q24H [facility]


   Last Admin: 03/13/17 17:06 Dose:  40 mg


Guaifenesin (Mucinex*)  1,200 mg PO BID [facility]


Haloperidol (Haldol Tab*)  5 mg PO Q6H PRN


   PRN Reason: AGITATION


   Last Admin: 03/11/17 19:42 Dose:  5 mg


Ceftriaxone Sodium 1,000 mg/ (Sodium Chloride)  50 mls @ 200 mls/hr IVPB Q24H 

[facility]


   Last Admin: 03/13/17 15:08 Dose:  200 mls/hr


Levothyroxine Sodium (Synthroid Tab*)  50 mcg PO DAILY@0600 [facility]


   Last Admin: 03/14/17 06:18 Dose:  50 mcg


Nystatin (Nystatin Top Powder*)  1 applic TOPICAL Q2H PRN


   PRN Reason: RASH


Polyethylene Glycol/Electrolytes (Miralax*)  17 gm PO DAILY [facility]


   Last Admin: 03/13/17 09:06 Dose:  17 gm


Quetiapine Fumarate (Seroquel Tab*)  75 mg PO BEDTIME [facility]


   Last Admin: 03/13/17 21:27 Dose:  75 mg


Quetiapine Fumarate (Seroquel Tab*)  50 mg PO DAILY [facility]


   Last Admin: 03/14/17 09:33 Dose:  50 mg


Senna (Senokot Tab*)  1 tab PO BEDTIME [facility]


   Last Admin: 03/13/17 21:27 Dose:  1 tab








 Vital Signs











  03/13/17 03/13/17 03/13/17





  11:05 11:20 15:09


 


Temperature  97.5 F 


 


Pulse Rate  84 


 


Respiratory 16 15 16





Rate   


 


Blood Pressure  110/50 





(mmHg)   


 


O2 Sat by Pulse  89 





Oximetry   














  03/13/17 03/13/17 03/13/17





  15:32 16:45 17:09


 


Temperature  97.3 F 


 


Pulse Rate 82  


 


Respiratory 20  16





Rate   


 


Blood Pressure 138/66  





(mmHg)   


 


O2 Sat by Pulse 95  





Oximetry   














  03/13/17 03/13/17 03/13/17





  19:52 20:00 21:27


 


Temperature 99.8 F  


 


Pulse Rate 91  


 


Respiratory 20 18 20





Rate   


 


Blood Pressure 138/62  





(mmHg)   


 


O2 Sat by Pulse 93  





Oximetry   














  03/13/17 03/13/17 03/14/17





  23:16 23:27 09:27


 


Temperature   


 


Pulse Rate 93  


 


Respiratory 16 16 16





Rate   


 


Blood Pressure 127/49  





(mmHg)   


 


O2 Sat by Pulse 92  





Oximetry   











Oxygen Devices in Use Now: None


Appearance: Elderly female, OOB to chair, in NAD, sleepy but arousable.


Eyes: No Scleral Icterus - periorbital ecchymosis


Ears/Nose/Mouth/Throat: Clear Oropharnyx


Neck: NL Appearance and Movements; NL JVP


Respiratory: Symmetrical Chest Expansion and Respiratory Effort, Clear to 

Auscultation - diminished


Cardiovascular: NL Sounds; No Murmurs; No JVD, RRR


Abdominal: NL Sounds; No Tenderness; No Distention


Extremities: No Edema


Skin: - - ecchymosis to face, extremities s/p fall


Neurological: - - unable to determine, patient does not always follow commands, 

hx dementia


Lines/Tubes/Other Access: Clean, Dry and Intact Peripheral IV


Nutrition: Taking PO's


Result Diagrams: 


 03/14/17 06:15





 03/13/17 13:29


Microbiology and Other Data: 


 Microbiology











 03/12/17 04:00 Nasal Screen MRSA (PCR)(BRIAN) - Final





 Nasal    Mrsa Negative














Assess/Plan/Problems-Billing


Assessment: 


Recurrent falls and acute on chronic dementia 2/2 UTI in an 84 yo F with hx of 

HTN, depression, hypothyroidism








- Patient Problems


(1) Anemia


Code(s): D64.9 - ANEMIA, UNSPECIFIED   Comment: 


HH dropped from 11 to 9, suspect that this may be in part dilutional


Unable to obtain stool occult, as no stool was present in rectal vault.


Obtain iron studies, B12, folate. If normal, will send to [facility] and 

recommend outpatient workup and follow-up.


VSS and no s/s of bleeding, though patient does have multiple ecchymotic areas 

from fall.   





(2) Physical deconditioning


Code(s): R53.81 - OTHER MALAISE   Comment: 


Multiple falls in previous weeks, per patient's daughters. 


Patient may benefit from assistive devices.


PT consult - if patient willing to work with therapists.


Recommend continued rehabilitation services at [facility].


Patient is able to stand and bear weight on both legs.   





(3) Dementia


Code(s): F03.90 - UNSPECIFIED DEMENTIA WITHOUT BEHAVIORAL DISTURBANCE   Comment

: 


Continue prn Haldol and home medications   





(4) Hypothyroidism


Code(s): E03.9 - HYPOTHYROIDISM, UNSPECIFIED   Comment: 


Continue synthroid   





(5) Thyroid cyst


Code(s): E04.1 - NONTOXIC SINGLE THYROID NODULE   Comment: 


TSH normal. Further workup as outpatient   





(6) Depression


Code(s): F32.9 - MAJOR DEPRESSIVE DISORDER, SINGLE EPISODE, UNSPECIFIED   

Comment: 


Continue home medications   





(7) DVT prophylaxis


Code(s): [id number] -    Comment: 


Lovenox   


Status and Disposition: 





Inpatient admission. Anticipate d/c tomorrow.

## 2017-03-15 VITALS — DIASTOLIC BLOOD PRESSURE: 55 MMHG | SYSTOLIC BLOOD PRESSURE: 145 MMHG

## 2017-03-15 RX ADMIN — BUPROPION HYDROCHLORIDE SCH MG: 100 TABLET ORAL at 09:02

## 2017-03-15 RX ADMIN — LEVOTHYROXINE SODIUM SCH MCG: 50 TABLET ORAL at 05:29

## 2017-03-15 RX ADMIN — ASPIRIN SCH MG: 81 TABLET, COATED ORAL at 09:01

## 2017-03-15 RX ADMIN — DIVALPROEX SODIUM SCH MG: 125 CAPSULE ORAL at 09:01

## 2017-03-15 RX ADMIN — GUAIFENESIN SCH MG: 600 TABLET, EXTENDED RELEASE ORAL at 09:01

## 2017-03-15 RX ADMIN — POLYETHYLENE GLYCOL 3350 SCH GM: 17 POWDER, FOR SOLUTION ORAL at 09:02

## 2017-03-15 RX ADMIN — QUETIAPINE SCH MG: 25 TABLET, FILM COATED ORAL at 09:02

## 2017-03-15 RX ADMIN — POLYMYXIN B SULFATE AND TRIMETHOPRIM SULFATE SCH: 10000; 1 SOLUTION/ DROPS OPHTHALMIC at 12:05

## 2017-03-15 RX ADMIN — POLYMYXIN B SULFATE AND TRIMETHOPRIM SULFATE SCH DROP: 10000; 1 SOLUTION/ DROPS OPHTHALMIC at 09:03

## 2017-03-15 NOTE — PN
Subjective


Date of Service: 03/15/17


Interval History: 





Patient seen and examined at bedside. Sheela is more interactive this morning. 

When I ask her to open her eyes, she states, "Can I do that?" She states "I don'

t think so" when I ask if anything is bothering her. Nursing reports patient 

had more alert periods overnight and this morning following the change to her 

alprazolam schedule. No new concerns.








Family History: Unchanged from Admission


Social History: Unchanged from Admission


Past Medical History: Unchanged from Admission





Objective


Active Medications: 








Acetaminophen (Tylenol Tab*)  650 mg PO Q4H PRN


   PRN Reason: FEVER/PAIN


   Last Admin: 03/11/17 19:42 Dose:  650 mg


Alprazolam (Xanax Tab*)  0.5 mg PO TID [facility]


   PRN Reason: AGITATION/ANXIETY


   Last Admin: 03/15/17 02:04 Dose:  0.5 mg


Aspirin (Aspirin Ec Low Dose*)  81 mg PO DAILY [facility]


   Last Admin: 03/14/17 09:33 Dose:  81 mg


Bupropion HCl (Wellbutrin Tab*)  100 mg PO TID [facility]


   Last Admin: 03/14/17 21:47 Dose:  100 mg


Cyanocobalamin (Vitamin B12 Inj *)  1,000 mcg IM MONTHLY [facility]


Divalproex Sodium (Depakote Sprinkle Cap*)  375 mg PO TID [facility]


   Last Admin: 03/14/17 21:47 Dose:  375 mg


Enoxaparin Sodium (Lovenox(*))  40 mg SUBCUT Q24H [facility]


   Last Admin: 03/14/17 15:30 Dose:  40 mg


Ferrous Sulfate (Ferrous Sulfate Tab*)  325 mg PO BID WITH MEALS [facility]


Guaifenesin (Mucinex*)  1,200 mg PO BID [facility]


   Last Admin: 03/14/17 21:48 Dose:  1,200 mg


Haloperidol (Haldol Tab*)  5 mg PO Q6H PRN


   PRN Reason: AGITATION


   Last Admin: 03/11/17 19:42 Dose:  5 mg


Levothyroxine Sodium (Synthroid Tab*)  50 mcg PO DAILY@0600 [facility]


   Last Admin: 03/15/17 05:29 Dose:  50 mcg


Nystatin (Nystatin Top Powder*)  1 applic TOPICAL Q2H PRN


   PRN Reason: RASH


Polyethylene Glycol/Electrolytes (Miralax*)  17 gm PO DAILY [facility]


   Last Admin: 03/14/17 09:33 Dose:  17 gm


Polymyxin/Trimethoprim Sulfate (Polytrim Ophth*)  1 drop BOTH EYES Q3H [facility]


Quetiapine Fumarate (Seroquel Tab*)  75 mg PO BEDTIME [facility]


   Last Admin: 03/14/17 21:47 Dose:  75 mg


Quetiapine Fumarate (Seroquel Tab*)  50 mg PO DAILY [facility]


   Last Admin: 03/14/17 09:33 Dose:  50 mg


Senna (Senokot Tab*)  1 tab PO BEDTIME [facility]


   Last Admin: 03/14/17 21:47 Dose:  1 tab








 Vital Signs











  03/14/17 03/14/17 03/14/17





  09:27 17:06 20:00


 


Temperature  97.5 F 


 


Pulse Rate  78 


 


Respiratory 16 16 16





Rate   


 


Blood Pressure  140/66 





(mmHg)   


 


O2 Sat by Pulse  98 





Oximetry   














  03/14/17 03/15/17 03/15/17





  23:34 02:04 04:04


 


Temperature 97.5 F  


 


Pulse Rate 90  


 


Respiratory 16 18 18





Rate   


 


Blood Pressure 140/68  





(mmHg)   


 


O2 Sat by Pulse   





Oximetry   














  03/15/17





  07:21


 


Temperature 97.6 F


 


Pulse Rate 83


 


Respiratory 





Rate 


 


Blood Pressure 145/55





(mmHg) 


 


O2 Sat by Pulse 95





Oximetry 











Oxygen Devices in Use Now: None


Appearance: Elderly female, lying in bed, confused, but more interactive, in NAD


Eyes: No Scleral Icterus - raccoon eyes


Neck: NL Appearance and Movements; NL JVP


Respiratory: Symmetrical Chest Expansion and Respiratory Effort, Clear to 

Auscultation -  diminished anteriorly


Cardiovascular: NL Sounds; No Murmurs; No JVD, RRR


Abdominal: NL Sounds; No Tenderness; No Distention


Extremities: - - healing right hip hematoma 


Skin: - - periorbital ecchymosis, RLE ecchymosis


Neurological: - - alert, confused, with hx dementia


Lines/Tubes/Other Access: Clean, Dry and Intact Peripheral IV


Nutrition: Taking PO's


Result Diagrams: 


 03/14/17 06:15





 03/13/17 13:29


Microbiology and Other Data: 


 Microbiology











 03/12/17 04:00 Nasal Screen MRSA (PCR)(BRIAN) - Final





 Nasal    Mrsa Negative














Assess/Plan/Problems-Billing


Assessment: 


Recurrent falls and acute on chronic dementia 2/2 UTI in an 82 yo F with hx of 

HTN, depression, hypothyroidism








- Patient Problems


(1) Anemia


Code(s): D64.9 - ANEMIA, UNSPECIFIED   Comment: 


HH dropped from 11 to 9, suspect that this may be in part dilutional.


No other s/s bleeding other than stable hematomas and bruising s/p fall.


Unable to obtain stool occult x 2, as no stool was present in rectal vault.


Low serum iron in the presence of normocytic, normochromic anemia. ?ACD


Start ferrous sulfate, recommend continued outpatient workup and follow-up.


Follow up outpatient CBC in 2 days.


VSS and no s/s of bleeding, though patient does have multiple ecchymotic areas 

from fall.   





(2) Physical deconditioning


Code(s): R53.81 - OTHER MALAISE   Comment: 


Multiple falls in previous weeks, per patient's daughters. 


Patient may benefit from assistive devices.


Patient did demonstrate ability to bear weight on both legs with 2 person 

assist. 


Unwilling to work with PT here in the hospital.


Recommend continued rehabilitation services at [facility].


   





(3) Dementia


Code(s): F03.90 - UNSPECIFIED DEMENTIA WITHOUT BEHAVIORAL DISTURBANCE   Comment

: 


Continue prn Haldol and home medications.


Alprazolam made PRN due to somnolence - patient now more alert.    





(4) Hypothyroidism


Code(s): E03.9 - HYPOTHYROIDISM, UNSPECIFIED   Comment: 


Continue synthroid   





(5) Thyroid cyst


Code(s): E04.1 - NONTOXIC SINGLE THYROID NODULE   Comment: 


TSH normal. Further workup as outpatient   





(6) Depression


Code(s): F32.9 - MAJOR DEPRESSIVE DISORDER, SINGLE EPISODE, UNSPECIFIED   

Comment: 


Continue home medications   





(7) DVT prophylaxis


Code(s): [id number] -    Comment: 


Lovenox   


Status and Disposition: 





Inpatient admission. D/c to [facility].

## 2017-03-15 NOTE — DS
TRANSFER SUMMARY:

 

DATE OF ADMISSION:  03/11/17

 

DATE OF DISCHARGE:  03/15/17

 

PROVIDER:  Velia Ji NP

 

ATTENDING PHYSICIAN:  Dr. Agapito Ortiz* (dictated by Velia Ji NP).

 

PRIMARY CARE PHYSICIAN:  Dr. Filiberto Mi.

 

PRIMARY DISCHARGE DIAGNOSES:

1.  Frequent falls.

2.  Physical deconditioning.

3.  Dementia.

4.  Anemia, suspect mixed type iron deficiency and chronic disease, in part 
secondary to hematoma.

5.  Right hip hematoma and contusion.

 

SECONDARY DISCHARGE DIAGNOSES:

1.  Hypertension.

2.  Depression.

3.  Dementia.

4.  Hypothyroidism.

5.  History of UTIs.

 

MEDICATIONS AT DISCHARGE:

1.  Vitamin B12 injection 1000 mcg monthly.

2.  Extra Strength Tylenol 1000 mg b.i.d.

3.  Senna 8.6 mg at bedtime.

4.  MiraLAX 17 g daily.

5.  Cholecalciferol 2000 units daily.

6.  Wellbutrin 100 mg t.i.d.

7.  Bupropion 100 mg t.i.d.

8.  Levothyroxine 50 mcg daily.

9.  Seroquel 50 mg daily and 75 mg at bedtime.

10.  Depakote  mg t.i.d.

11.  Guaifenesin 1200 mg b.i.d.

12.  Polytrim eye drops 1 drop both eyes q.3 hours x5 days.

13.  Nystatin powder 1 application topical q.2 hours p.r.n. to affected areas.

14.  Ferrous sulfate 325 mg b.i.d. with meals.

15.  Xanax 0.5 mg t.i.d. p.r.n.  This is a change; the previous dosing was 
scheduled.

 

Polytrim eye drops and nystatin powder are both new orders.  Guaifenesin is a 
new order.

 

The patient's aspirin is on hold until cleared by her primary care physician.  
This is secondary to the patient's right hip hematoma, falls, and anemia.

 

DIAGNOSTIC TESTING DURING THIS ADMISSION:  Brain CT from 03/11/17, impression:

 

1.  Mild right parietal scalp swelling.  Negative for fracture or traumatic 
brain injury.

2.  Involutional change and stigmata of chronic microvascular disease.  No 
acute intracranial process evident.

 

CT of the cervical spine on 03/11/17, impression:  Negative for traumatic 
cervical spine injury.

 

Chest x-ray from 03/11/17, impression:  Stigmata of probable chronic 
obstructive pulmonary disease.  No acute cardiopulmonary process evident.

 

Elbow x-ray from 03/11/17, impression:  Mild dorsal soft tissue swelling of the 
right elbow post fall.  Negative for joint effusion or fracture.

 

Hip pelvis x-ray from 03/11/17, impression:  Soft tissue edema within the 
subcutaneous tissue plane lateral to the right hip.

 

No radiographic evidence for right hip fracture.  As x-rays may be negative 
with nondisplaced hip fracture, if there is persistent clinical concern, MRI or 
CT would be suggested.

 

HOSPITAL COURSE OF STAY:  For full details, please refer to full H and P 
provided by Dr. Mueller on 03/11/17.  In summary, Ms. Cash is an 83-year-old 
female with past medical history of dementia with behavioral disturbances, 
depression, hypothyroidism, recurrent UTIs, who presented to the ER for 
evaluation after sustaining a fall at [facility].  The patient has been noted to 
have recurrent falls at the facility.  The patient has significant dementia and 
limited verbal ability at this point in time and was not able to provide much 
more history.  However, her family does communicate that she has had frequent 
falls and was found lying on the ground in her room.  Additionally, they note 
that she does usually ambulate without the use of a walker or cane and that she 
does not always stand up straight when she is walking.  The patient is known to 
be prone to urinary tract infections.  We were unable to get an adequate 
history regarding any history of urinary symptoms from the patient.  She 
received ceftriaxone in the emergency department, which was continued during 
her admission.  However, her urinalysis was not indicative of UTI with no 
nitrates or leukocyte esterase.  The patient did ultimately receive 3 days of 
ceftriaxone for treatment of suspected UTI, although this is most likely not 
the case.

 

In regards to the patient's dementia and history of aggressive behavior, she 
was continued on her home medications; however, it was noted that she was 
particularly somnolent while she was here and hard to engage.  We did switch 
her alprazolam to t.i.d. p.r.n., as it was previously scheduled, which did 
allow for more wakeful periods and interactiveness.

 

In terms of falls, the patient does appear to be deconditioned by report.  It 
was felt that she would benefit from physical therapy and, perhaps, an 
assistive device. However, the patient was uncooperative with the physical 
therapy staff when they attempted here in the hospital.  We were able to 
transfer her from the bed to the chair with a max 2 assist of nursing.  However
, the patient did help me and another nurse to help her stand and participated 
by pushing herself up out of the chair and standing, demonstrating that she can 
weight bear on her lower extremities.  I have included a PT referral so that 
she can continue rehabilitation at [facility] as she is willing to participate, 
and hopefully this will help her be able to obtain a rollator walker or another 
assistive device that will be helpful in providing the patient with more 
stability, thereby preventing falls.

 

When the patient was admitted, it was noted that her H and H was 11.1 and 34, 
upon recheck a couple of days later, it was 9.0 and 27.  I do suspect that some 
of this was dilutional given the IV fluid that she was receiving; however, we 
did send off iron studies and reticulocyte count.  She does have a mildly 
elevated reticulocyte count as well as a low serum iron level; however, her 
TIBC and ferritin levels are normal as was her B12 and folate.  Additionally, 
the patient has normocytic- normochromic anemia indicating this may be anemia 
of chronic disease.  With her low iron level as well as her right hip hematoma, 
I suspect that the origin of her anemia has multiple etiologies. I did attempt 
to obtain a stool specimen on 2 separate occasions, however, there was no stool 
in the rectal vault for a specimen to be collected.  Her H and H have remained 
stable at this level and she demonstrates no other signs of bleeding.  She does 
have periorbital bruising from her previous falls, as well as a right hip 
hematoma.  However, there are no other signs or symptoms of bleeding.  Vital 
signs have remained stable. She has not had any black tarry stools or 
hematemesis.  The patient is recommended to have a followup with her PCP at the 
facility and to continue outpatient workup for her anemia.  I have started her 
on ferrous sulfate.  I am temporarily holding her aspirin as she does have 
multiple falls and is at risk for further bleeding.  This can be resumed by her 
PCP in the outpatient setting when it is felt that it is appropriate.

 

I did start the patient on guaifenesin, as she does have productive cough; 
however, there are no fevers, no wheezing, or no new oxygen demands.  She is on 
Polytrim eye drops.  I do suspect that the patient has some discharge noted on 
her eyes mostly due to the fact that she is resistant to opening her eyes here 
in the hospital; however, I did start her on this eye drops in case it was an 
underlying eye infection.  I did not notice any conjunctivitis or additional 
eye swelling beyond what was evident from when the patient came in.

 

I did discuss this with the patient's daughter Paige Howard as well as a 
plan of care.  She is in agreement with this.  As of 03/15/17, Ms. Cash 
appeared stable for discharge. She has been treated prophylactically for UTI.  
We recommend further physical therapy and rehab therapies for her, and we have 
ordered a followup CBC to be drawn on Friday and sent to her PCP so we can 
continue following her anemia. Further hematologic workup may be warranted.

 

DISPOSITION:  Ms. Cash will be discharged to [facility] on 03/
15/17 with followup with Dr. Mi.

 

DIET:  Regular diet.

 

ACTIVITY:  As tolerated.

 

CONDITION:  Improved, stable.  Recommend physical therapy and rehabilitation 
services.

 

TIME SPENT:  Time spent on this discharge was approximately 50 minutes.

 

Again, this is only a brief summary of the patient's hospital course of stay.  
For full details, please refer to the full medical record.  If you have any 
further questions or need further assistance, please feel free to contact me at 
[phone].

 

____________________________________ 

VELIA JI NP

 

CC:  Dr. Filiberto Mi*

 

[id number]/[facility] #: [id number]

[facility]

## 2017-12-21 ENCOUNTER — HOSPITAL ENCOUNTER (INPATIENT)
Dept: HOSPITAL 25 - ED | Age: 82
LOS: 1 days | Discharge: NURSING, REHAB OR LONG-TERM CARE | DRG: 153 | End: 2017-12-22
Attending: HOSPITALIST | Admitting: INTERNAL MEDICINE
Payer: MEDICARE

## 2017-12-21 DIAGNOSIS — Z91.041: ICD-10-CM

## 2017-12-21 DIAGNOSIS — Z91.048: ICD-10-CM

## 2017-12-21 DIAGNOSIS — Z87.440: ICD-10-CM

## 2017-12-21 DIAGNOSIS — K21.9: ICD-10-CM

## 2017-12-21 DIAGNOSIS — E86.0: ICD-10-CM

## 2017-12-21 DIAGNOSIS — Z80.9: ICD-10-CM

## 2017-12-21 DIAGNOSIS — F05: ICD-10-CM

## 2017-12-21 DIAGNOSIS — E03.9: ICD-10-CM

## 2017-12-21 DIAGNOSIS — Z83.6: ICD-10-CM

## 2017-12-21 DIAGNOSIS — Z90.710: ICD-10-CM

## 2017-12-21 DIAGNOSIS — R40.2412: ICD-10-CM

## 2017-12-21 DIAGNOSIS — Z88.0: ICD-10-CM

## 2017-12-21 DIAGNOSIS — F32.9: ICD-10-CM

## 2017-12-21 DIAGNOSIS — F03.90: ICD-10-CM

## 2017-12-21 DIAGNOSIS — N17.9: ICD-10-CM

## 2017-12-21 DIAGNOSIS — M19.90: ICD-10-CM

## 2017-12-21 DIAGNOSIS — R74.8: ICD-10-CM

## 2017-12-21 DIAGNOSIS — I24.8: ICD-10-CM

## 2017-12-21 DIAGNOSIS — I10: ICD-10-CM

## 2017-12-21 DIAGNOSIS — J11.1: Primary | ICD-10-CM

## 2017-12-21 DIAGNOSIS — Z88.8: ICD-10-CM

## 2017-12-21 DIAGNOSIS — Z88.2: ICD-10-CM

## 2017-12-21 LAB
ALBUMIN SERPL BCG-MCNC: 3.9 G/DL (ref 3.2–5.2)
ALP SERPL-CCNC: 42 U/L (ref 34–104)
ALT SERPL W P-5'-P-CCNC: 17 U/L (ref 7–52)
ANION GAP SERPL CALC-SCNC: 9 MMOL/L (ref 2–11)
AST SERPL-CCNC: 20 U/L (ref 13–39)
BUN SERPL-MCNC: 26 MG/DL (ref 6–24)
BUN/CREAT SERPL: 13.2 (ref 8–20)
CALCIUM SERPL-MCNC: 9.1 MG/DL (ref 8.6–10.3)
CHLORIDE SERPL-SCNC: 101 MMOL/L (ref 101–111)
GLOBULIN SER CALC-MCNC: 4 G/DL (ref 2–4)
GLUCOSE SERPL-MCNC: 92 MG/DL (ref 70–100)
HCO3 SERPL-SCNC: 27 MMOL/L (ref 22–32)
HCT VFR BLD AUTO: 40 % (ref 35–47)
HGB BLD-MCNC: 13.5 G/DL (ref 12–16)
MCH RBC QN AUTO: 32 PG (ref 27–31)
MCHC RBC AUTO-ENTMCNC: 34 G/DL (ref 31–36)
MCV RBC AUTO: 93 FL (ref 80–97)
POTASSIUM SERPL-SCNC: 3.6 MMOL/L (ref 3.5–5)
PROT SERPL-MCNC: 7.9 G/DL (ref 6.4–8.9)
RBC # BLD AUTO: 4.28 10^6/UL (ref 4–5.4)
SODIUM SERPL-SCNC: 137 MMOL/L (ref 133–145)
TROPONIN I SERPL-MCNC: 0.12 NG/ML (ref ?–0.04)
WBC # BLD AUTO: 11.8 10^3/UL (ref 3.5–10.8)

## 2017-12-21 PROCEDURE — 87641 MR-STAPH DNA AMP PROBE: CPT

## 2017-12-21 PROCEDURE — 85610 PROTHROMBIN TIME: CPT

## 2017-12-21 PROCEDURE — 82570 ASSAY OF URINE CREATININE: CPT

## 2017-12-21 PROCEDURE — 36415 COLL VENOUS BLD VENIPUNCTURE: CPT

## 2017-12-21 PROCEDURE — 93005 ELECTROCARDIOGRAM TRACING: CPT

## 2017-12-21 PROCEDURE — 71010: CPT

## 2017-12-21 PROCEDURE — 70450 CT HEAD/BRAIN W/O DYE: CPT

## 2017-12-21 PROCEDURE — 87086 URINE CULTURE/COLONY COUNT: CPT

## 2017-12-21 PROCEDURE — 87502 INFLUENZA DNA AMP PROBE: CPT

## 2017-12-21 PROCEDURE — 80053 COMPREHEN METABOLIC PANEL: CPT

## 2017-12-21 PROCEDURE — 85730 THROMBOPLASTIN TIME PARTIAL: CPT

## 2017-12-21 PROCEDURE — 80048 BASIC METABOLIC PNL TOTAL CA: CPT

## 2017-12-21 PROCEDURE — 81003 URINALYSIS AUTO W/O SCOPE: CPT

## 2017-12-21 PROCEDURE — 84300 ASSAY OF URINE SODIUM: CPT

## 2017-12-21 PROCEDURE — 84484 ASSAY OF TROPONIN QUANT: CPT

## 2017-12-21 PROCEDURE — 87040 BLOOD CULTURE FOR BACTERIA: CPT

## 2017-12-21 PROCEDURE — 85025 COMPLETE CBC W/AUTO DIFF WBC: CPT

## 2017-12-21 PROCEDURE — 83880 ASSAY OF NATRIURETIC PEPTIDE: CPT

## 2017-12-21 PROCEDURE — 80164 ASSAY DIPROPYLACETIC ACD TOT: CPT

## 2017-12-21 PROCEDURE — 83605 ASSAY OF LACTIC ACID: CPT

## 2017-12-21 RX ADMIN — HEPARIN SODIUM SCH UNITS: 5000 INJECTION INTRAVENOUS; SUBCUTANEOUS at 21:43

## 2017-12-21 RX ADMIN — NYSTATIN SCH: 100000 POWDER TOPICAL at 21:58

## 2017-12-21 RX ADMIN — BUPROPION HYDROCHLORIDE SCH MG: 100 TABLET ORAL at 21:42

## 2017-12-21 NOTE — ED
Elisa GALDAMEZ Gabriel, scribed for Wally Barton MD on 12/21/17 at 1609 .





Altered Mental Status





- HPI Summary


HPI Summary: 





This patient is a 84 year old F BIBA to [facility] after her nursing home called for 

AMS since earlier today.  Patient reports fatigue and congestion.  Patient 

denies pain and any trauma.  Ems says she is barley responsive and has very 

brief moments of being responsive.


LEVEL 5 CAVEAT: HPI limited due to the patient being minimally responsive and 

demented











- History Of Current Complaint


Chief Complaint: EDGeneral


Stated Complaint: AMS


Time Seen by Provider: 12/21/17 15:18


Hx Obtained From: Patient, EMS


Last Known Well Date: 12/20/17


Onset/Duration: Still Present


Timing: Intermittent


Severity Initially: Moderate


Severity Currently: Moderate


Character: Confusion, Responsiveness - decreased





- Allergies/Home Medications


Allergies/Adverse Reactions: 


 Allergies











Allergy/AdvReac Type Severity Reaction Status Date / Time


 


Adhesive Tape Allergy  Rash Verified 03/11/17 18:31


 


Amoxicillin Allergy  Unknown Verified 03/11/16 16:05





   Reaction  





   Details  


 


Clavulanic Acid Allergy  Unknown Verified 03/11/16 16:05





[From Augmentin]   Reaction  





   Details  


 


Memantine Allergy  Headache Verified 03/11/17 18:31


 


Metronidazole Allergy  Unknown Verified 03/11/17 18:31





   Reaction  





   Details  


 


Nalidixic Acid Allergy  Headache Verified 03/11/17 18:31


 


Risperidone Allergy  GI Upset Verified 03/11/17 18:31


 


Sulfa Antibiotics Allergy  Unknown Verified 03/11/17 18:31





   Reaction  





   Details  


 


Yellow Dye Allergy  Unknown Verified 03/11/16 16:05





   Reaction  





   Details  











Home Medications: 


 Home Medications





Acetaminophen TAB* [Tylenol TAB*] 325 mg PO Q4H PRN MDD 3000 mg 12/21/17 [

History Confirmed 12/21/17]


Albuterol/Ipratropium NEB.SOL* [Duoneb (Albuterol 2.5 MG/Ipratropium 0.5 MG)] 1 

neb INH Q6H PRN 12/21/17 [History Confirmed 12/21/17]


Cholecalciferol CAP/TAB(NF) [Vitamin D3 CAP/TAB (NF)] 2,000 unit PO 0930 12/21/ 17 [History Confirmed 12/21/17]


Ferrous Sulfate TAB* 325 mg PO QAM 12/21/17 [History Confirmed 12/21/17]


Furosemide TAB* [Lasix TAB*] 20 mg PO DAILY 12/21/17 [History Confirmed 12/21/17

]


Senna TAB* [Senokot TAB*] 2 tab PO BEDTIME 12/21/17 [History Confirmed 12/21/17]


traZODone TAB* [Desyrel TAB*] 25 mg PO BEDTIME MDD 25 mg 12/21/17 [History 

Confirmed 12/21/17]











PMH/Surg Hx/FS Hx/Imm Hx


Previously Healthy: No


Endocrine/Hematology History: Reports: Hx Thyroid Disease - HYPO


Cardiovascular History: Reports: Hx Hypertension


GI History: Reports: Hx Gastroesophageal Reflux Disease


 History: Reports: Other  Problems/Disorders - ureter dilitation


   Comment Only: Hx Kidney Infection - bladder infections


Musculoskeletal History: Reports: Hx Arthritis


Neurological History: Reports: Hx Dementia





- Surgical History


Surgery Procedure, Year, and Place: TOTAL HYSTERECTOMY


Hx Anesthesia Reactions: No


Infectious Disease History: No


Infectious Disease History: Reports: Hx Shingles


   Denies: Traveled Outside the [place] in Last 30 Days





- Family History


Known Family History: Positive: Unknown


Family History: Pt is a poor historian.





- Social History


Alcohol Use: None


Substance Use Type: Reports: None


Smoking Status (MU): Never Smoked Tobacco





Review of Systems





- ROS Summary


Review of Systems Summary: 





LEVEL 5 CAVEAT: ROS limited due to the patient being minimally responsive and 

demented


Constitutional: Negative - pain and trauma 


Positive: Fever


Positive: Other - chest congestion 


Neurological: Other - AMS 


All Other Systems Reviewed And Are Negative: No





Physical Exam





- Summary


Physical Exam Summary: 





Appearance: Well appearing, no pain distress


Skin: warm, dry, reflects adequate perfusion


Head/face: normal


Eyes: EOMI, MINGO


ENT: mucous membranes are dry 


Neck: supple, non-tender, No JVD 


Respiratory: CTA, breath sounds present, respiration is unlabored, there is 

coarseness in the right base


Cardiovascular:  pulses symmetrical. Patient has a grad 4 murmur 


Abdomen: non-tender, soft


Bowel: present


Musculoskeletal: normal, strength/ROM intact, no edema 


Neuro: sensory motor intact, oriented to person only, no focal deficits or 

weakness 


 





Triage Information Reviewed: Yes


Vital Signs On Initial Exam: 


 Initial Vitals











Pulse Pulse Ox


 


 83   92 


 


 12/21/17 15:11  12/21/17 15:11











Vital Signs Reviewed: Yes





- Zahida Coma Scale


Coma Scale Total: 13





Diagnostics





- Vital Signs


 Vital Signs











  Temp Pulse Resp BP Pulse Ox


 


 12/21/17 15:54      93


 


 12/21/17 15:16  99.1 F  81  18  121/66  94


 


 12/21/17 15:11   83    92














- Laboratory


Lab Results: 


 Lab Results











  12/21/17 12/21/17 12/21/17 Range/Units





  16:20 16:40 16:40 


 


WBC     (3.5-10.8)  10^3/ul


 


RBC     (4.0-5.4)  10^6/ul


 


Hgb     (12.0-16.0)  g/dl


 


Hct     (35-47)  %


 


MCV     (80-97)  fL


 


MCH     (27-31)  pg


 


MCHC     (31-36)  g/dl


 


RDW     (10.5-15)  %


 


Plt Count     (150-450)  10^3/ul


 


MPV     (7.4-10.4)  um3


 


Neut % (Auto)     (38-83)  %


 


Lymph % (Auto)     (25-47)  %


 


Mono % (Auto)     (1-9)  %


 


Eos % (Auto)     (0-6)  %


 


Baso % (Auto)     (0-2)  %


 


Absolute Neuts (auto)     (1.5-7.7)  10^3/ul


 


Absolute Lymphs (auto)     (1.0-4.8)  10^3/ul


 


Absolute Monos (auto)     (0-0.8)  10^3/ul


 


Absolute Eos (auto)     (0-0.6)  10^3/ul


 


Absolute Basos (auto)     (0-0.2)  10^3/ul


 


Absolute Nucleated RBC     10^3/ul


 


Nucleated RBC %     


 


INR (Anticoag Therapy)    0.96  (0.77-1.02)  


 


APTT    32.5  (26.0-36.3)  seconds


 


Sodium     (133-145)  mmol/L


 


Potassium     (3.5-5.0)  mmol/L


 


Chloride     (101-111)  mmol/L


 


Carbon Dioxide     (22-32)  mmol/L


 


Anion Gap     (2-11)  mmol/L


 


BUN     (6-24)  mg/dL


 


Creatinine     (0.51-0.95)  mg/dL


 


Est GFR ( Amer)     (>60)  


 


Est GFR (Non-Af Amer)     (>60)  


 


BUN/Creatinine Ratio     (8-20)  


 


Glucose     ()  mg/dL


 


Lactic Acid     (0.5-2.0)  mmol/L


 


Calcium     (8.6-10.3)  mg/dL


 


Total Bilirubin     (0.2-1.0)  mg/dL


 


AST     (13-39)  U/L


 


ALT     (7-52)  U/L


 


Alkaline Phosphatase     ()  U/L


 


Troponin I     (<0.04)  ng/mL


 


B-Natriuretic Peptide   86  ( - 100) pg/mL


 


Total Protein     (6.4-8.9)  g/dL


 


Albumin     (3.2-5.2)  g/dL


 


Globulin     (2-4)  g/dL


 


Albumin/Globulin Ratio     (1-3)  


 


Valproic Acid     ()  mcg/mL


 


Influenza A (Rapid)  Positive H    (Negative)  


 


Influenza B (Rapid)  Negative    (Negative)  














  12/21/17 12/21/17 12/21/17 Range/Units





  16:40 16:40 16:40 


 


WBC  11.8 H    (3.5-10.8)  10^3/ul


 


RBC  4.28    (4.0-5.4)  10^6/ul


 


Hgb  13.5    (12.0-16.0)  g/dl


 


Hct  40    (35-47)  %


 


MCV  93    (80-97)  fL


 


MCH  32 H    (27-31)  pg


 


MCHC  34    (31-36)  g/dl


 


RDW  14    (10.5-15)  %


 


Plt Count  147 L    (150-450)  10^3/ul


 


MPV  9    (7.4-10.4)  um3


 


Neut % (Auto)  71.1    (38-83)  %


 


Lymph % (Auto)  17.6 L    (25-47)  %


 


Mono % (Auto)  10.7 H    (1-9)  %


 


Eos % (Auto)  0    (0-6)  %


 


Baso % (Auto)  0.6    (0-2)  %


 


Absolute Neuts (auto)  8.4 H    (1.5-7.7)  10^3/ul


 


Absolute Lymphs (auto)  2.1    (1.0-4.8)  10^3/ul


 


Absolute Monos (auto)  1.3 H    (0-0.8)  10^3/ul


 


Absolute Eos (auto)  0    (0-0.6)  10^3/ul


 


Absolute Basos (auto)  0.1    (0-0.2)  10^3/ul


 


Absolute Nucleated RBC  0    10^3/ul


 


Nucleated RBC %  0    


 


INR (Anticoag Therapy)     (0.77-1.02)  


 


APTT     (26.0-36.3)  seconds


 


Sodium   137   (133-145)  mmol/L


 


Potassium   3.6   (3.5-5.0)  mmol/L


 


Chloride   101   (101-111)  mmol/L


 


Carbon Dioxide   27   (22-32)  mmol/L


 


Anion Gap   9   (2-11)  mmol/L


 


BUN   26 H   (6-24)  mg/dL


 


Creatinine   1.97 H   (0.51-0.95)  mg/dL


 


Est GFR ( Amer)   31.1   (>60)  


 


Est GFR (Non-Af Amer)   24.2   (>60)  


 


BUN/Creatinine Ratio   13.2   (8-20)  


 


Glucose   92   ()  mg/dL


 


Lactic Acid    1.7  (0.5-2.0)  mmol/L


 


Calcium   9.1   (8.6-10.3)  mg/dL


 


Total Bilirubin   0.40   (0.2-1.0)  mg/dL


 


AST   20   (13-39)  U/L


 


ALT   17   (7-52)  U/L


 


Alkaline Phosphatase   42   ()  U/L


 


Troponin I   0.12 H*   (<0.04)  ng/mL


 


B-Natriuretic Peptide    ( - 100) pg/mL


 


Total Protein   7.9   (6.4-8.9)  g/dL


 


Albumin   3.9   (3.2-5.2)  g/dL


 


Globulin   4.0   (2-4)  g/dL


 


Albumin/Globulin Ratio   1.0   (1-3)  


 


Valproic Acid   72.0   ()  mcg/mL


 


Influenza A (Rapid)     (Negative)  


 


Influenza B (Rapid)     (Negative)  











Result Diagrams: 


 12/21/17 16:40





 12/21/17 16:40


Lab Statement: Any lab studies that have been ordered have been reviewed, and 

results considered in the medical decision making process.





- Radiology


  ** CXR


Radiology Interpretation Completed By: Radiologist - MILD TO MODERATE VASCULAR 

CONGESTIVE FINDINGS. ED physician has reviewed this radiology report.





- CT


  ** CT Head 


CT Interpretation Completed By: Radiologist - No acute intracranial findings. 

Ethmoid sinusitis. ED physician has reviewed this radiology report





- EKG


  ** 1824


Cardiac Rate: NL


EKG Rhythm: Sinus Rhythm - at 79 BPM


ST Segment: Normal


EKG Interpretation: normal axis 





Re-Evaluation





- Re-Evaluation


  ** First Eval


Re-Evaluation Time: 17:19


Change: Unchanged - Patient has no cough or SOB but she is having full body 

chills.





Altered Mental Statu Course/Dx





- Course


Course Of Treatment: Pt clinically ill, proved to be FLU+. Unable to relay much 

hx personally. Dementia with new delirium. No focal deficit or weakness. NIHSS 

at baseline (confusion). Hydrated. Started tamiflu. Dry, with new ARF. Admit 

for further.





- Diagnoses


Discharge Diagnoses: 


 Influenza, Dementia, Delirium, Acute renal failure








- Provider Notifications


Discussed Care Of Patient With: Emmanuel Bermudez


Time Discussed With Above Provider: 17:30


Instructed by Provider To: Admit As Inpatient





Discharge





- Discharge Plan


Condition: Guarded


Disposition: ADMITTED TO [facility] documentation as recorded by the Elisa remy Gabriel accurately reflects 

the service I personally performed and the decisions made by me, Wally Barton MD.

## 2017-12-21 NOTE — RAD
INDICATION: Confusion     



COMPARISON: March 11, 2017

 

TECHNIQUE: An AP seated portable view obtained at 1600 hours is submitted.



FINDINGS: 



Bones/Soft Tissues: There are no acute bony findings.    



Cardiomediastinal: The heart is normal in size. The central pulmonary vessels and

interstitium are prominent compatible with mild to moderate pulmonary vascular congestion.





Lungs: There are no focal infiltrates.



Pleura: There are no significant pleural effusions. 



Other: None



IMPRESSION:  MILD TO MODERATE VASCULAR CONGESTIVE FINDINGS.

## 2017-12-21 NOTE — RAD
INDICATION: Confusion. Dementia.     



COMPARISON: CT brain March 11, 2017



TECHNIQUE: Noncontrast axial source images were acquired from the skull base to the

vertex.



FINDINGS:



Ventricles/sulci: There is cortical atrophy with compensatory dilatation of the CSF

spaces.



Brain parenchyma: There is periventricular and subcortical white matter change compatible

with chronic ischemia.



Intracranial hemorrhage:None.



Extra-axial spaces: There are no abnormal extra axial fluid collections or evidence of

extra-axial mass.



Calvarium: There is no calvarial fracture or other calvarial abnormality.



Scalp: There is no evidence of scalp or extracalvarial soft tissue abnormality.



Paranasal sinuses/mastoid: There is bilateral ethmoid sinusitis. The remaining paranasal

sinuses and mastoid air cells are clear.



Other: None.



IMPRESSION: No acute intracranial findings. Ethmoid sinusitis.

## 2017-12-21 NOTE — HP
CC:  Reza*

 

HISTORY AND PHYSICAL:

 

DATE OF ADMISSION:  12/21/17

 

PRIMARY CARE PROVIDER:  Reza.

 

ATTENDING PHYSICIAN WHILE IN THE HOSPITAL:  Dr. Ian Serrato* (report being 
dictated by Emmanuel Parrish NP).

 

CHIEF COMPLAINT:

1.  Weakness.

2.  Decreased appetite.

 

HISTORY OF PRESENT ILLNESS:  I would like to preface the report by saying the 
patient has a significant amount of underlying dementia.  She is really unable 
to give a good history.  She comes in to our ER today.  According to the 
daughter, last she got a call from Reza stating that last couple of days, 
Sheela has not really been eating or drinking.  Her mother had not gotten up today
, which is unusual for her.  She states typically her baseline is that she will 
get up with her walker and she will walk around the facility and she is usually 
pretty active from the standpoint.  She does have underlying dementia, 
underlying hypertension, history of frequent UTIs, hypothyroidism, and 
depression.  The daughter was concerned today because this was the second day 
where she really was not getting up or acting herself.  She was also noted to 
be not eating or drinking.  There were no reports of fevers, chills, vomiting, 
diarrhea.  No reports of cold-like symptoms per the daughter and the patient is 
really unable to remember if she has had any symptoms, so it is really hard to 
get a good review of systems or HPI, but there was concern.  She ultimately 
came in, was found to have an elevated troponin of unclear significance and she 
was in acute renal failure.  In addition to this, was found to be flu positive.
  So, we were asked to evaluate for admission.

 

PAST MEDICAL HISTORY:  Significant for:

 

1.  Hypertension.

2.  Depression.

3.  Dementia.

4.  Hypothyroidism.

5.  History of UTI. PAST SURGICAL HISTORY:

1.  The patient has had hysterectomy.

2.  She has had right upper extremity ORIF.

 

MEDICATIONS:  Home meds include:

 

1.  Tylenol 325 mg p.o. every 4 hours as needed.

2.  Albuterol ipratropium 1 neb every 6 hours as needed.

3.  Seroquel 75 mg p.o. at bedtime.

4.  Depakote 375 mg p.o. t.i.d.

5.  Trazodone 25 mg p.o. at bedtime.

6.  Vitamin D3 2000 units p.o. daily.

7.  Lasix 20 mg daily.

8.  B12 injections 1000 mcg IM monthly.

9.  Ferrous sulfate 325 mg p.o. daily.

10.  Senna 2 tabs p.o. at bedtime.

11.  Tylenol Extra Strength 1000 mg p.o. b.i.d.

12.  Wellbutrin 100 mg p.o. t.i.d.

13.  Synthroid 50 mcg p.o. daily.

 

ALLERGIES TO MEDICATIONS:  Include ADHESIVE TAPE, AMOXICILLIN, CLAVULANIC ACID, 
MEMANTINE, FLAGYL, RISPERIDONE, SULFA, YELLOW DYE, NALIDIXIC ACID.

 

FAMILY HISTORY:  Mother had a history of cancer.  Father had a history of 
pneumonia.

 

SOCIAL HISTORY:  The patient does not smoke, does not drink.  Surrogate 
decision maker is her daughter.

 

REVIEW OF SYSTEMS:  Unable to be obtained because of the patient's underlying 
dementia.

 

                               PHYSICAL EXAMINATION

 

GENERAL:  At this time, Ms. Cash is an 84-year-old female patient.  She is 
sitting in the ED stretcher.  She does not appear to be in any acute distress.

 

VITAL SIGNS:  Blood pressure 121/60, pulse 80, respirations 18, O2 sat 95%, and 
temperature 99.1.

 

HEENT:  Head:  Atraumatic.  Eyes:  EOMs are intact.  Sclerae anicteric.  Throat
: Oral mucosa appears to be dry.  No oropharyngeal erythema.

 

NECK:  Supple.

 

LUNGS:  Clear to auscultation bilaterally.  No wheezes, rales, or rhonchi.

 

HEART:  Sounds S1, S2.  Regular rate and rhythm.  No murmurs, rubs, or gallops.

 

ABDOMEN:  Soft, flat, nontender.  Bowel sounds present.

 

EXTREMITIES:  Pulses were 2+ throughout.  She is moving all 4 extremities with 5
/5 strength.

 

NEUROLOGIC:  The patient is awake.  She is alert to herself.  She is oriented 
to herself only.  She is confused to time and place.  No gross focal deficits.

 

SKIN:  Intact with the exception she does have redness and erythema underneath 
her abdominal folds.

 

 DIAGNOSTIC STUDIES/LAB DATA:  The labs today revealed WBC 11.8, RBC of 4.28, 
hemoglobin 13.5, hematocrit of 40, platelet count of 147.  Her INR was 0.96, 
PTT of 32.5.  Sodium 137, potassium 3.6, chloride 101, bicarb 27, BUN 26, 
creatinine 1.97, glucose 92, lactate 1.7, calcium 9.1.  Total bili 0.4, AST 20, 
ALT 17, alk phos 42. Troponin 0.12.  BNP of 86.  Albumin of 3.9.  Valproic acid 
level pending.  Serology is positive for flu.

 

She did have a brain CT obtained today, which showed no acute intracranial 
findings, ethmoid sinusitis.

 

She had a chest x-ray obtained today, impression:  On my review, I did not 
appreciate any infiltrates.  There is maybe mild pulmonary edema, Radiology 
read as mild-to-moderate vascular congestive finding.

 

EKG today shows a sinus rhythm, she did have a T-wave inversion in V1, but this 
has been there previously, rate of 79, no ST elevations.  Old medical records 
were reviewed.

 

ASSESSMENT AND PLAN:  Ms. Cash is an 84-year-old female patient coming in to 
the ED today with complaints of decreased appetite, weakness, not feeling well 
on evaluation.  She was brought in to the hospital, found to have flu, acute 
renal failure, and elevated troponin.  We were asked to evaluate for admission.
  She will be admitted under inpatient status for:

 

1.  Influenza.  At this point, I did order Tamiflu, renally dosed with 30 cc a 
day, which we need to keep an eye on this as her renal function may improve and 
she may require higher dosing.  We will continue with IV fluids and supportive 
care.

2.  Elevated troponin.  Etiology of this is unclear.  It could be demand 
ischemia and underlying infection.  We will trend these, get an echo, place her 
on telemetry, and repeat the EKG in the morning.

3.  Acute renal failure.  We will check a FENa, bladder scan, hydrate her, and 
continue to follow this closely, and I am holding her Lasix as this could be an 
offending agent.

4.  Hypertension.  In the setting of acute illness, we will monitor this and 
treat as needed, but right now, she is in the 120s to one teens systolic.  I 
think we can just follow.

5.  Depression.  Continue supportive care.  We will continue all of her meds 
with the exception of Depakote.  We will check a level in the setting of acute 
renal failure.  For levels on the high side, I may hold this until her renal 
function improves.

6.  Dementia.  We will continue her meds as prescribed and will continue 
supportive care.

7.  Hypothyroidism.  Continue her meds as prescribed.

8.  DVT prophylaxis.  She will be placed on heparin subcu.

9.  Code status.  She is a full code.

10.  Fluids, electrolytes, and nutrition.  She can have a regular diet.

 

TIME SPENT:  On the admission was approximately 60 minutes, greater than half 
the time was spent face-to-face with the patient obtaining my history and 
physical, other half time was spent going over the plan of care with the 
patient and implementing plan of care.

 

I did discuss the plan of care with my attending, Dr. Serrato; he is in 
agreement.

 

 ____________________________________ 

EMMANUEL PARRISH, CHELSEA

 

[id number] #: [id number]

NATY

## 2017-12-22 VITALS — SYSTOLIC BLOOD PRESSURE: 161 MMHG | DIASTOLIC BLOOD PRESSURE: 80 MMHG

## 2017-12-22 LAB
ANION GAP SERPL CALC-SCNC: 9 MMOL/L (ref 2–11)
BUN SERPL-MCNC: 18 MG/DL (ref 6–24)
BUN/CREAT SERPL: 15.4 (ref 8–20)
CALCIUM SERPL-MCNC: 8.9 MG/DL (ref 8.6–10.3)
CHLORIDE SERPL-SCNC: 103 MMOL/L (ref 101–111)
GLUCOSE SERPL-MCNC: 86 MG/DL (ref 70–100)
HCO3 SERPL-SCNC: 26 MMOL/L (ref 22–32)
HCT VFR BLD AUTO: 38 % (ref 35–47)
HGB BLD-MCNC: 13 G/DL (ref 12–16)
MCH RBC QN AUTO: 31 PG (ref 27–31)
MCHC RBC AUTO-ENTMCNC: 34 G/DL (ref 31–36)
MCV RBC AUTO: 92 FL (ref 80–97)
POTASSIUM SERPL-SCNC: 3.5 MMOL/L (ref 3.5–5)
RBC # BLD AUTO: 4.15 10^6/UL (ref 4–5.4)
SODIUM SERPL-SCNC: 138 MMOL/L (ref 133–145)
SODIUM UR-SCNC: 134 MMOL/L
TROPONIN I SERPL-MCNC: 0.11 NG/ML (ref ?–0.04)
WBC # BLD AUTO: 10.9 10^3/UL (ref 3.5–10.8)

## 2017-12-22 RX ADMIN — HEPARIN SODIUM SCH UNITS: 5000 INJECTION INTRAVENOUS; SUBCUTANEOUS at 05:58

## 2017-12-22 RX ADMIN — NYSTATIN SCH APPLIC: 100000 POWDER TOPICAL at 08:17

## 2017-12-22 RX ADMIN — BUPROPION HYDROCHLORIDE SCH MG: 100 TABLET ORAL at 08:17

## 2017-12-22 NOTE — DS
CC:  [facility] *

 

DISCHARGE SUMMARY:

 

DATE OF ADMISSION:  12/21/17

 

DATE OF DISCHARGE:  12/22/17

 

PRIMARY CARE PROVIDER:  [facility].

 

ATTENDING PHYSICIAN WHILE IN THE HOSPITAL:  Rosanna Quick DO * (report 
dictated by Emmanuel Parrish NP).

 

PRINCIPAL DIAGNOSIS:  Include influenza infection.

 

SECONDARY DIAGNOSES:

1.  Dementia with acute delirium resolving.

2.  Depression.

3.  Hypothyroidism.

4.  History of urinary tract infection in the past.

5.  Acute renal failure, now resolved.

 

DISCHARGE MEDICATIONS:  Include:

1.  Tylenol 325 mg every 4 hours as needed.

2.  DuoNeb one neb every 6 hours as needed.

3.  Seroquel 75 mg p.o. b.i.d.

4.  Depakote 375 mg p.o. t.i.d.

5.  Trazodone 25 mg at bedtime.

6.  Vitamin D3 2000 units p.o. daily.

7.  Lasix 20 mg p.o. daily to resume tomorrow.

8.  B12 1000 mcg IM monthly.

9.  Ferrous sulfate 325 mg p.o. daily.

10.  Senna one to two tablets p.o. daily.

11.  Tylenol 1000 mg p.o. b.i.d.

12.  Wellbutrin 100 mg p.o. t.i.d.

13.  Synthroid 50 mcg daily.

 

New medications:

1.  Tamiflu 30 mg p.o. b.i.d. for four more days.

2.  Nystatin powder one application topically t.i.d. until fungal infection 
resolves to abdominal fold.

 

STATUS DURING HOSPITALIZATION:  Observation, a complex medical case.  I refer 
you to medical chart for further details.

 

CONDITION ON DISCHARGE:  Stable.

 

ACTIVITY:  As tolerated.

 

DIET:  Regular.

 

HISTORY OF PRESENT ILLNESS:  I refer you to my H and P dictated yesterday for 
further details.  In short, Ms. Cash is an 84-year-old female patient that 
presented to our ED yesterday with complaints of not feeling well, altered 
mental status, having difficulty with becoming aroused and was not acting 
herself.  She came into the ED where it was ultimately noted that she appeared 
to be dehydrated, she appeared to be in acute renal failure, she appeared to 
have influenza.  We were asked to evaluate for admission.  In addition to this, 
it was also noted that her troponin was mildly elevated at 0.12.  We were asked 
to evaluate.  She was admitted.  Serial troponins were trended.  They stayed 
right around 0.12 then went down to 0.11.  On telemetry, there were no events.  
She, this morning, was combative.  She was restless.  She was pulling her leads 
off.  She was able to ambulate to the bathroom and toilet with help of the 
nursing staff which is near her baseline per her daughters.  Her daughter said 
that she is most likely feeling better if this is how she is acting.  She was 
given a dose of Haldol, so she is a little drowsy now, but her acute renal 
failure had resolved.  Her troponins had stayed at 0.12 range.  I had a long 
discussion with both daughters and at this point they do not want to pursue 
aggressive therapy given her underlying dementia, they would not want heart 
catheterization.  We tried ordering an echo, but the patient was so combative 
and not allowing it, that it could not be done safely.  So at this point, it 
was felt that we would forgo further workup of this.  It was probably the 
troponins bumped demand ischemia.  She never once complained of any chest pain.
  Her EKG remained stable, so it was felt that she can be safely discharge and 
she was back to her baseline.  I did instruct Reza, I called them 
personally, to make sure that they can get her Tamiflu for the weekend.  In 
addition to this, I asked them to monitor for any fevers, chills, nausea, 
vomiting, shortness of breath, or any other worse symptoms and to send her back 
immediately should any of these arise, so she will be discharged home.

 

PHYSICAL EXAM ON DISCHARGE:  Vital Signs:  Blood pressure 161/80 with a pulse 
of 84, respirations 18, O2 sat was 98% on room air, temperature 98.0.  General:
  At this time, Ms. Cash is an 84-year-old patient, she is sitting in the 
hospital bed.  She does not appeared to be in any acute distress.  HEENT:  Head 
is atraumatic.  Sclerae anicteric and not pale.  Neck:  Supple.  Throat:  Oral 
mucosa appears to be moist.  No oropharyngeal erythema.  Heart:  Sounds S1 and 
S2, regular rate and rhythm.  No murmurs, rubs, or gallops.  Lungs:  Clear to 
auscultation bilaterally.  No wheezes, rales, or rhonchi.  Abdomen:  Soft, flat
, nontender. Bowel sounds are present.  Extremities:  Pulses are 2+ throughout.
  No peripheral edema.  Neurologic:  She is awake.  She will respond.  She is 
confused to time and place, but she is oriented to herself.  No gross focal 
deficits.  Skin:  Intact.

 

LABORATORY DATA:  Labs on discharge revealed WBC 10.9, RBC 4.15, hemoglobin 13.0
, hematocrit of 38, and platelets 145.  INR 0.96.  Sodium 138, potassium 3.5, 
chloride of 103, bicarbonate 26, BUN 18, creatinine 1.17 which is back to her 
baseline.  Lactates were negative.  Troponin this morning was 0.11, which is 
down from 0.12.  Toxicology:  Her Depakote level was 72.  Her serology was 
positive for flu.

 

She had a chest x-ray on admission which revealed mild to moderate vascular 
congestion.  She had a brain CT as well which revealed no acute intracranial 
findings, ethmoid sinusitis.  She did have an EKG today which revealed normal 
sinus rhythm with a rate of 79, no ST elevation or T-wave inversions.  Old 
medical records were reviewed.

 

ISSUES TO BE ADDRESSED ON FOLLOWUP:

1.  Influenza.  At this point, she will continue a course of Tamiflu.  Continue 
hydration.  She is on Tamiflu for four more days at 30 mg b.i.d. due to her 
renal function.

2.  Hypertension.  Restart her medications tomorrow.

3.  Depression.  Continue with supportive care and start her meds tomorrow.

4.  Dementia.  Continue her medicines prescribed.

5.  Elevated troponin.  Again, family did not want aggressive workup.  This is 
probably related to demand ischemia.  The patient was combative so that we 
could not obtain an echo safely and family again did not want aggressive workup
, so at this point no further workup will be done.

6.  Acute renal failure, resolved.

 

ISSUES TO RETURN TO THE HOSPITAL:  Include, but not limited to chest pain, fever
, chills, nausea, vomiting, shortness of breath or any other worrisome symptoms.

 

TIME SPENT:  Time spent on the discharge was approximately 40 minutes, greater 
than half the time was spent face-to-face with the patient going over the 
discharge plan, the other half of the time was spent implementing the discharge 
plan.  I did discussed the discharge plan with the patient's daughter, with 
Reza and also discussed with my attending.

 

____________________________________ EMMANUEL PARRISH, NP

 

[id number] #: [id number]

NATY

## 2017-12-28 ENCOUNTER — HOSPITAL ENCOUNTER (EMERGENCY)
Dept: HOSPITAL 25 - ED | Age: 82
Discharge: HOME | End: 2017-12-28
Payer: MEDICARE

## 2017-12-28 VITALS — DIASTOLIC BLOOD PRESSURE: 74 MMHG | SYSTOLIC BLOOD PRESSURE: 99 MMHG
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
## 2017-12-28 DIAGNOSIS — F03.90: ICD-10-CM
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
## 2017-12-28 DIAGNOSIS — Z88.0: ICD-10-CM

## 2017-12-28 DIAGNOSIS — E03.9: ICD-10-CM

## 2017-12-28 DIAGNOSIS — I10: ICD-10-CM

## 2017-12-28 DIAGNOSIS — M25.551: Primary | ICD-10-CM

## 2017-12-28 DIAGNOSIS — K21.9: ICD-10-CM

## 2017-12-28 PROCEDURE — 99282 EMERGENCY DEPT VISIT SF MDM: CPT

## 2017-12-28 NOTE — RAD
INDICATION: Right hip pain after a fall



COMPARISON: Similar radiograph dated March 11, 2017

 

TECHNIQUE: 3 views of the right hip were obtained.



FINDINGS: 



The visualized bones of the right hip are well-corticated and properly aligned. The joint

spaces are normal. There is no radiographic evidence of acute fracture or dislocation.



IMPRESSION:  Normal radiograph of the right hip. 



If the patient's symptoms persist follow-up imaging is recommended.
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
## 2018-07-07 ENCOUNTER — HOSPITAL ENCOUNTER (INPATIENT)
Dept: HOSPITAL 25 - ED | Age: 83
LOS: 3 days | Discharge: NURSING, REHAB OR LONG-TERM CARE | DRG: 871 | End: 2018-07-10
Attending: INTERNAL MEDICINE | Admitting: HOSPITALIST
Payer: MEDICARE

## 2018-07-07 DIAGNOSIS — A41.9: Primary | ICD-10-CM

## 2018-07-07 DIAGNOSIS — Z91.041: ICD-10-CM

## 2018-07-07 DIAGNOSIS — Z88.4: ICD-10-CM

## 2018-07-07 DIAGNOSIS — L26: ICD-10-CM

## 2018-07-07 DIAGNOSIS — E53.8: ICD-10-CM

## 2018-07-07 DIAGNOSIS — K21.9: ICD-10-CM

## 2018-07-07 DIAGNOSIS — G93.40: ICD-10-CM

## 2018-07-07 DIAGNOSIS — F39: ICD-10-CM

## 2018-07-07 DIAGNOSIS — Z88.8: ICD-10-CM

## 2018-07-07 DIAGNOSIS — J18.9: ICD-10-CM

## 2018-07-07 DIAGNOSIS — R45.1: ICD-10-CM

## 2018-07-07 DIAGNOSIS — Z83.6: ICD-10-CM

## 2018-07-07 DIAGNOSIS — Z88.1: ICD-10-CM

## 2018-07-07 DIAGNOSIS — E03.9: ICD-10-CM

## 2018-07-07 DIAGNOSIS — I10: ICD-10-CM

## 2018-07-07 DIAGNOSIS — Z80.9: ICD-10-CM

## 2018-07-07 DIAGNOSIS — Z91.048: ICD-10-CM

## 2018-07-07 DIAGNOSIS — Z88.2: ICD-10-CM

## 2018-07-07 DIAGNOSIS — E61.1: ICD-10-CM

## 2018-07-07 DIAGNOSIS — Z90.710: ICD-10-CM

## 2018-07-07 DIAGNOSIS — I25.10: ICD-10-CM

## 2018-07-07 DIAGNOSIS — M19.90: ICD-10-CM

## 2018-07-07 DIAGNOSIS — F03.91: ICD-10-CM

## 2018-07-07 DIAGNOSIS — R29.6: ICD-10-CM

## 2018-07-07 DIAGNOSIS — R33.9: ICD-10-CM

## 2018-07-07 DIAGNOSIS — F41.8: ICD-10-CM

## 2018-07-07 LAB
BASOPHILS # BLD AUTO: 0 10^3/UL (ref 0–0.2)
EOSINOPHIL # BLD AUTO: 0 10^3/UL (ref 0–0.6)
HCT VFR BLD AUTO: 42 % (ref 35–47)
HGB BLD-MCNC: 14.6 G/DL (ref 12–16)
INR PPP/BLD: 0.94 (ref 0.77–1.02)
LYMPHOCYTES # BLD AUTO: 1.3 10^3/UL (ref 1–4.8)
MCH RBC QN AUTO: 32 PG (ref 27–31)
MCHC RBC AUTO-ENTMCNC: 35 G/DL (ref 31–36)
MCV RBC AUTO: 91 FL (ref 80–97)
MONOCYTES # BLD AUTO: 1.4 10^3/UL (ref 0–0.8)
NEUTROPHILS # BLD AUTO: 15.4 10^3/UL (ref 1.5–7.7)
NRBC # BLD AUTO: 0 10^3/UL
NRBC BLD QL AUTO: 0
PLATELET # BLD AUTO: 158 10^3/UL (ref 150–450)
RBC # BLD AUTO: 4.62 10^6/UL (ref 4–5.4)
WBC # BLD AUTO: 18.1 10^3/UL (ref 3.5–10.8)

## 2018-07-07 PROCEDURE — 85730 THROMBOPLASTIN TIME PARTIAL: CPT

## 2018-07-07 PROCEDURE — G0480 DRUG TEST DEF 1-7 CLASSES: HCPCS

## 2018-07-07 PROCEDURE — 82140 ASSAY OF AMMONIA: CPT

## 2018-07-07 PROCEDURE — 85025 COMPLETE CBC W/AUTO DIFF WBC: CPT

## 2018-07-07 PROCEDURE — 80329 ANALGESICS NON-OPIOID 1 OR 2: CPT

## 2018-07-07 PROCEDURE — 82553 CREATINE MB FRACTION: CPT

## 2018-07-07 PROCEDURE — G0378 HOSPITAL OBSERVATION PER HR: HCPCS

## 2018-07-07 PROCEDURE — 87899 AGENT NOS ASSAY W/OPTIC: CPT

## 2018-07-07 PROCEDURE — 84443 ASSAY THYROID STIM HORMONE: CPT

## 2018-07-07 PROCEDURE — 80320 DRUG SCREEN QUANTALCOHOLS: CPT

## 2018-07-07 PROCEDURE — 83880 ASSAY OF NATRIURETIC PEPTIDE: CPT

## 2018-07-07 PROCEDURE — 83690 ASSAY OF LIPASE: CPT

## 2018-07-07 PROCEDURE — 84484 ASSAY OF TROPONIN QUANT: CPT

## 2018-07-07 PROCEDURE — 86140 C-REACTIVE PROTEIN: CPT

## 2018-07-07 PROCEDURE — 93005 ELECTROCARDIOGRAM TRACING: CPT

## 2018-07-07 PROCEDURE — 87040 BLOOD CULTURE FOR BACTERIA: CPT

## 2018-07-07 PROCEDURE — 83605 ASSAY OF LACTIC ACID: CPT

## 2018-07-07 PROCEDURE — 36415 COLL VENOUS BLD VENIPUNCTURE: CPT

## 2018-07-07 PROCEDURE — 87641 MR-STAPH DNA AMP PROBE: CPT

## 2018-07-07 PROCEDURE — 85610 PROTHROMBIN TIME: CPT

## 2018-07-07 PROCEDURE — 82550 ASSAY OF CK (CPK): CPT

## 2018-07-07 PROCEDURE — 83735 ASSAY OF MAGNESIUM: CPT

## 2018-07-07 PROCEDURE — 71045 X-RAY EXAM CHEST 1 VIEW: CPT

## 2018-07-07 PROCEDURE — 99284 EMERGENCY DEPT VISIT MOD MDM: CPT

## 2018-07-07 PROCEDURE — 80053 COMPREHEN METABOLIC PANEL: CPT

## 2018-07-07 PROCEDURE — 80048 BASIC METABOLIC PNL TOTAL CA: CPT

## 2018-07-07 PROCEDURE — 81003 URINALYSIS AUTO W/O SCOPE: CPT

## 2018-07-07 PROCEDURE — 80164 ASSAY DIPROPYLACETIC ACD TOT: CPT

## 2018-07-07 RX ADMIN — ENOXAPARIN SODIUM SCH MG: 40 INJECTION SUBCUTANEOUS at 16:47

## 2018-07-07 RX ADMIN — QUETIAPINE SCH MG: 25 TABLET, FILM COATED ORAL at 20:31

## 2018-07-07 RX ADMIN — DIVALPROEX SODIUM SCH MG: 125 TABLET, DELAYED RELEASE ORAL at 20:32

## 2018-07-07 RX ADMIN — SENNOSIDES SCH TAB: 8.6 TABLET, FILM COATED ORAL at 20:43

## 2018-07-07 RX ADMIN — ACETAMINOPHEN SCH MG: 325 TABLET ORAL at 20:40

## 2018-07-07 RX ADMIN — BUPROPION HYDROCHLORIDE SCH MG: 100 TABLET ORAL at 19:17

## 2018-07-07 RX ADMIN — GUAIFENESIN SCH MG: 600 TABLET, EXTENDED RELEASE ORAL at 20:43

## 2018-07-07 RX ADMIN — NYSTATIN SCH APPLIC: 100000 POWDER TOPICAL at 20:30

## 2018-07-07 NOTE — RAD
INDICATION:  Altered mental status cough and fever.



COMPARISON:  There are no prior studies available for comparison.



TECHNIQUE: A portable view of the chest was obtained.



FINDINGS: The heart is within normal limits in size.



The lungs are underinflated. There are small infiltrates at both lung bases. No pleural

effusion is seen.



IMPRESSION:  SMALL BIBASILAR INFILTRATES.

## 2018-07-07 NOTE — HP
CC:  [facility]; Dr. Trevon Thomas *

 

MEDICINE HISTORY AND PHYSICAL:

 

DATE OF ADMISSION:  07/07/18

 

PROVIDER:  Velia Ji NP.

 

ATTENDING PHYSICIAN:  Dr. Jeff Gonzales * (dictated by Velia Ji NP).

 

PRIMARY CARE PROVIDER:  Dr. Thomas and [facility].

 

CHIEF COMPLAINT:  Altered mental status and fevers.

 

HISTORY OF PRESENT ILLNESS:  History is limited secondary to the the patient's 
dementia and altered mental status per the nursing notes as well as per report 
from the patient's daughter, who is Paige Howard.  Ms. Cash was last seen 
at her baseline normal on Thursday, 07/05/18.  Per the staff and family on 
Friday, the patient was starting to become slightly more lethargic and was not 
as active as she usually is.  At her baseline, she is ambulatory.  She can 
somewhat indicate if she has distress, although she is not consistent with her 
ability to express herself verbally. She does ambulate throughout the facility.
  She usually does eat and drink fairly well and her daughter denies that she 
has had any concerns with choking or aspirating that has been observed by 
either the family or staff.  On Thursday, the family visited and there were no 
acute complaints.  She was eating and drinking at baseline.  Yesterday, she was 
more somnolent and started to be noticed have a cough.  Today, the resident 
noted her to have a fever of 102, which did respond to a Tylenol suppository 
and due to her altered mental status and sleepiness, she was sent to the ER for 
further evaluation.

 

Here in the ER, her chest x-ray showed concerns for small bibasilar 
infiltrates. She does have a white count of 18,000 and a CRP of 52.95.  Urine 
is negative for nitrites or leukocyte esterase.  Her O2 saturation is around 90
% to 93% on room air.  She is rather coarse sounding for lungs, but has not 
coughed, never brought up sputum.  She remains somewhat somnolent and minimally 
responsive, although she does respond to noxious stimuli.  Given these findings
, Hospital Medicine was consulted for admission.

 

PAST MEDICAL HISTORY:  Includes history of dementia with behavioral disturbances
, hypothyroidism, hypertension, depression with anxiety, coronary artery disease
, B12 and iron deficiencies, repeated falls, constipation, mood disorder, 
psychiatric disorder with delusions and exfoliative dermatitis.

 

PAST SURGICAL HISTORY:  Includes hysterectomy and ORIF of the right upper 
extremity.

 

MEDICATIONS:

1.  Trazodone 25 mg at bedtime.

2.  Bupropion 100 mg t.i.d.

3.  Senna 2 tablets at bedtime.

4.  Seroquel 75 mg b.i.d.

5.  Nystatin 1 application topical t.i.d.

6.  Levothyroxine 50 mcg q.a.m.

7.  Furosemide 20 mg daily.

8.  Ferrous sulfate 325 mg q.a.m.

9.  Depakote  mg t.i.d.

10.  Vitamin B12 injections 1000 mcg IM monthly.

11.  Vitamin D3, 2000 units q.a.m.

12.  DuoNeb 1 nebulizer inhaled q.6 hours p.r.n.

13.  Acetaminophen 1000 mg b.i.d. and 375 mg q.4 hours p.r.n.

 

ALLERGIES:  Are multiple and they include ADHESIVE TAPE, AMOXICILLIN, 
CLAVULANIC ACID, GINKGO BILOBA, IODINE, MEMANTINE, NALIDIXIC ACID, RISPERIDONE, 
RIVASTIGMINE, SULFA, YELLOW DYE, ANESTHESIA, TARTRAZINE.

 

FAMILY HISTORY:  Unobtainable secondary to the patient's altered mental status. 
Per previous records, I do note that the patient's family history had 
previously reported the mother with a history of cancer and her father with a 
history of pneumonia.

 

SOCIAL HISTORY:  The patient does not smoke, drink or have a history of drug 
use. She lives at [facility].  Her surrogate decision maker is her 
daughter, Paige Howard.

 

REVIEW OF SYSTEMS:  Limited secondary to underlying dementia and altered mental 
status.  All obtainable findings as per HPI.

 

                               PHYSICAL EXAMINATION

 

GENERAL:  This is an elderly female patient, well-developed, lying in bed.  She 
does not respond to verbal stimuli.  She does respond to tactile stimuli, 
noxious stimuli, quickly closes her eyes.  She does not appear to be in any 
acute distress.

 

VITAL SIGNS:  Temperature 98.7, pulse rate 88, respiratory rate 22, blood 
pressure 148/77 and O2 saturation is 93% on room air.

 

HEENT:  Head is atraumatic.  Eyes:  Pupils are responsive to light and equal. 
Further pupillary response unassessed secondary to the patient's lack in 
cooperation.  Sclerae are anicteric.  The patient does not willingly open mouth
, although lips seem somewhat dry.

 

NECK:  Supple.

 

LUNGS:  Rhonchorous throughout.  The patient does not take a deep breath and so 
breast sounds are coarse, somewhat shallow.

 

HEART:  Normal S1, S2 heart sounds with regular rate and rhythm.  No murmurs 
appreciated.  There is mild peripheral edema bilaterally with 2+ distal pulses.

 

ABDOMEN:  Soft, nontender, nondistended with normoactive bowel sounds.  No 
guarding or flinching elicited with palpation of the abdomen.

 

EXTREMITIES:  No clubbing or cyanosis.

 

NEURO:  Difficult to assess secondary to altered mental status and lack of 
cooperation.  She does respond at one point to her name, at baseline apparently 
is confused to time and place.  Unable to assess cranial nerves and if focal 
deficits are present secondary to altered mental status and dementia.

 

SKIN:  Appears intact with the exception of some mild erythema under the 
abdominal folds, which looks to be healing candidiasis.

 

 LABORATORY DATA AND DIAGNOSTIC STUDIES:  CBC:  WBC 18.1, hemoglobin 14.6, 
hematocrit 42, platelet count 158.  CMP:  Sodium 133, potassium 3.9, chloride 98
, carbon dioxide 26, BUN 11, creatinine 0.95, glucose 129, lactic acid 1.2, 
calcium 9.4, magnesium 1.8.  Total bilirubin 0.9, AST 18, ALT 15, alk phos 53, 
ammonia is 43.  Troponin is 0.02.  CRP 52.9.  .  Lipase 13.  TSH 2.39.  
As per above chest x-ray does show concern for small bilateral bibasilar 
infiltrates.  



EKG shows a rate of 88, sinus rhythm with no significant ST or T-wave changes.  
Old medical records were reviewed.

 

ASSESSMENT AND PLAN:  This is an 84-year-old female who presents today with 
altered mental status and some lethargy, which appears to be secondary to 
community- acquired pneumonia.  She will be admitted under observation to the 
medical floor. Plans are as follows:

 

1.  Community-acquired pneumonia.  She is a resident at [facility].  She has 
concern for borderline hypoxia as well as fever as reported by the facility and 
altered mental status.  Respiratory is increased and for this reason, it is 
reasonable to admit the patient for further observation and IV antibiotics 
therapy.  She has been started on ceftriaxone and azithromycin, which we will 
continue.  She is ordered oxygen therapy per protocol.  Her daughter denies any 
history of aspiration, but I have ordered a nursing swallow study at bedside 
once the patient becomes alert enough to attempt eating to ensure that she is 
not aspirating and to recommend any compounding of the current infiltrates and 
she is also ordered guaifenesin.  We will continue her p.o. and DuoNebs and 
follow up with labs tomorrow.

2.  History of dementia with behavioral disturbances.  She is on multiple 
medications and also carries a history of depression with anxiety and 
essentially some mood disorder with some psychotic disorder.  I have held her 
trazodone at night time, but we will cautiously continue her Seroquel and 
Depakote DR with hold parameters for increased lethargy and altered mental 
status.  Continue supportive care.  Please continue bupropion.

3.  Hypothyroidism.  Continue levothyroxine at current dose.

4.  Hypertension.  Continue furosemide.

5.  Coronary artery disease.  Due to her history of recurrent falls, she is not 
on aspirin therapy. She is not on a beta-blocker.  She is not on a statin.

6.  History of iron and B12 deficiency.  She can continue her outpatient IM 
injections as scheduled.  Continue ferrous sulfate here in the facility.

7.  FEN.  The patient does have a mild hypomagnesemia.  She has been given 
magnesium repletion.  We will give her 1 more additional bag of IV fluids.  She 
is ordered a heart-healthy diet.

8.  DVT prophylaxis.  She is ordered a TREY hose and subcu Lovenox.

9.  Code status.  Her daughter was present at the time of my assessment and I 
did discuss with her the patient's MOLST form, which appears to have been 
filled out last week on 06/28/18, it does indicate CPR with trial of intubation 
and noninvasive ventilation.  I did discuss with her that CPR may not be the 
best course of action for this patient given her age and comorbidities as well 
as her ability to recover from CPR.  The daughter was able to express 
understanding of this and states that her mother has always indicated to do as 
much as possible in order to keep her alive.  We did review what CPR in details 
and that we would be unable to avoid ventilation in that event.  She is in 
agreement with escalating therapies to bypass and intubation if warranted 
during this hospitalization. However, I did encourage her to review the MOLST 
form and the current indications with her family as CPR is the best viable 
option for her mother.  We discussed even including DNR with a trial of 
intubation, which would still allow for maximum treatment by understanding that 
should the patient's heart stop, the CPR will not be the best indication and 
perhaps it would be reasonable to allow for natural death.  She verbalized 
understanding of this and again, we will continue to readdress this with family 
followup of code status should be continued as appropriate with the family 
whether she is here at the facility or back at [facility].

10.  Disposition.  The patient should be discharged to [facility] when medically stable.

 

TIME SPENT:  Approximately 60 minutes were spent on the admission for this 
patient and more than half of that time was spent face-to-face with the patient 
and family obtaining history and physical, performing physical examination and 
reviewing the plan of care.  Plan of care was also reviewed with my attending, 
Dr. Gonzales, who is in agreement.

 

 ____________________________________ VELIA JI NP

 

[id number]/CPS #: [id number]

NATY

## 2018-07-07 NOTE — ED
Alex GALDAMEZ Rebecca, scribed for Jorge Bullard MD on 07/07/18 at 1246 .





Altered Mental Status





- HPI Summary


HPI Summary: 


Pt is an 83 y/o F with a PMHx of dementia BIBA from nursing home who presents 

to ED due to increased AMS. According to nursing home staff, pt is less 

responsive than baseline. Per nurse's triage, she had a fever of 100.1 at NH 

but on arrival her temperature was 98.7. 





Level 5 caveat due to AMS.








- History Of Current Complaint


Chief Complaint: EDAltMentalStatus


Stated Complaint: AMS


Time Seen by Provider: 07/07/18 12:40


Hx Obtained From: Medical Records


Hx From Patient Unobtainable Due To: Altered Mental Status


Onset/Duration: Still Present


Character: Responsiveness - Decreased


Associated Signs And Symptoms: Positive: Fever





- Allergies/Home Medications


Allergies/Adverse Reactions: 


 Allergies











Allergy/AdvReac Type Severity Reaction Status Date / Time


 


Adhesive Tape Allergy  Rash Verified 07/07/18 13:10


 


amoxicillin Allergy  Unknown Verified 07/07/18 13:12





   Reaction  





   Details  


 


clavulanic acid Allergy  Unknown Verified 07/07/18 13:12





   Reaction  





   Details  


 


ginkgo biloba Allergy  Unknown Verified 07/07/18 13:12





   Reaction  





   Details  


 


iodine Allergy  Unknown Verified 07/07/18 13:12





   Reaction  





   Details  


 


memantine [From Namenda] Allergy  Unknown Verified 07/07/18 13:12





   Reaction  





   Details  


 


nalidixic acid [From NegGram] Allergy  Unknown Verified 07/07/18 13:12





   Reaction  





   Details  


 


risperidone [From Risperdal] Allergy  Unknown Verified 07/07/18 13:12





   Reaction  





   Details  


 


rivastigmine [From Exelon] Allergy  Unknown Verified 07/07/18 13:12





   Reaction  





   Details  


 


Sulfa (Sulfonamide Allergy  Unknown Verified 07/07/18 13:12





Antibiotics)   Reaction  





   Details  


 


yellow dye Allergy  Unknown Verified 07/07/18 13:12





   Reaction  





   Details  


 


anesthesia Allergy  Unknown Uncoded 07/07/18 13:12





   Reaction  





   Details  


 


tartrazine Allergy  Unknown Uncoded 07/07/18 13:12





   Reaction  





   Details  














PMH/Surg Hx/FS Hx/Imm Hx


Endocrine/Hematology History: Reports: Hx Thyroid Disease - HYPO


Cardiovascular History: Reports: Hx Hypertension


GI History: Reports: Hx Gastroesophageal Reflux Disease


 History: Reports: Other  Problems/Disorders - ureter dilitation


   Comment Only: Hx Kidney Infection - bladder infections


Musculoskeletal History: Reports: Hx Arthritis, Other Musculoskeletal History - 

RUE ORIF


Sensory History: 


   Denies: Hx Contacts or Glasses, Hx Hearing Aid


Opthamlomology History: 


   Denies: Hx Contacts or Glasses


Neurological History: Reports: Hx Dementia


Psychiatric History: Reports: Hx Depression - Wellbutrin





- Surgical History


Surgery Procedure, Year, and Place: TOTAL HYSTERECTOMY, RUE ORIF


Hx Anesthesia Reactions: No


Infectious Disease History: No


Infectious Disease History: Reports: Hx Shingles


   Denies: Traveled Outside the [place] in Last 30 Days





- Family History


Known Family History: Positive: Unknown


Family History: Family History limited due to level 5 caveat: AMS





- Social History


Alcohol Use: None


Substance Use Type: Reports: None


Smoking Status (MU): Never Smoked Tobacco





Review of Systems


Positive: Fever


Neurological: Other - AMS (decerased responsiveness)


All Other Systems Reviewed And Are Negative: No





- Comments


Additional Review of Systems Comments: 


Level 5 caveat due to AMS





Physical Exam





- Summary


Physical Exam Summary: 


General: well-appearing, no pain distress


Skin: warm, color reflects adequate perfusion, dry


Head: normal


Eyes: EOMI, pupils 2 mm and minimally reactive


ENT: normal


Neck: supple, nontender


Respiratory: Rhonchi bilaterally, breath sounds present


Cardiovascular: RRR


Musculoskeletal: she can move all 4 extremities but is extremely weak


Neurological: does not respond to commands


Triage Information Reviewed: Yes


Vital Signs On Initial Exam: 


 Initial Vitals











Temp Pulse Resp BP Pulse Ox


 


 98.7 F   88   18   133/63   95 


 


 07/07/18 12:33  07/07/18 12:33  07/07/18 12:33  07/07/18 12:33  07/07/18 12:33











Vital Signs Reviewed: Yes


Completion Of Physical Exam Limited Due To: Altered Mental Status





Diagnostics





- Vital Signs


 Vital Signs











  Temp Pulse Resp BP Pulse Ox


 


 07/07/18 12:33  98.7 F  88  18  133/63  95














- Laboratory


Lab Results: 


 Lab Results











  07/07/18 07/07/18 07/07/18 Range/Units





  12:54 12:54 12:54 


 


WBC  18.1 H    (3.5-10.8)  10^3/ul


 


RBC  4.62    (4.00-5.40)  10^6/ul


 


Hgb  14.6    (12.0-16.0)  g/dl


 


Hct  42    (35-47)  %


 


MCV  91    (80-97)  fL


 


MCH  32 H    (27-31)  pg


 


MCHC  35    (31-36)  g/dl


 


RDW  13    (10.5-15)  %


 


Plt Count  158    (150-450)  10^3/ul


 


MPV  8.6    (7.4-10.4)  um3


 


Neut % (Auto)  84.8 H    (38-83)  %


 


Lymph % (Auto)  7.4 L    (25-47)  %


 


Mono % (Auto)  7.6 H    (0-7)  %


 


Eos % (Auto)  0    (0-6)  %


 


Baso % (Auto)  0.2    (0-2)  %


 


Absolute Neuts (auto)  15.4 H    (1.5-7.7)  10^3/ul


 


Absolute Lymphs (auto)  1.3    (1.0-4.8)  10^3/ul


 


Absolute Monos (auto)  1.4 H    (0-0.8)  10^3/ul


 


Absolute Eos (auto)  0    (0-0.6)  10^3/ul


 


Absolute Basos (auto)  0    (0-0.2)  10^3/ul


 


Absolute Nucleated RBC  0    10^3/ul


 


Nucleated RBC %  0    


 


INR (Anticoag Therapy)   0.94   (0.77-1.02)  


 


APTT   30.7   (26.0-36.3)  seconds


 


Sodium    133 L  (135-145)  mmol/L


 


Potassium    3.9  (3.5-5.0)  mmol/L


 


Chloride    98 L  (101-111)  mmol/L


 


Carbon Dioxide    26  (22-32)  mmol/L


 


Anion Gap    9  (2-11)  mmol/L


 


BUN    11  (6-24)  mg/dL


 


Creatinine    0.95  (0.51-0.95)  mg/dL


 


Est GFR ( Amer)    67.8  (>60)  


 


Est GFR (Non-Af Amer)    56.0  (>60)  


 


BUN/Creatinine Ratio    11.6  (8-20)  


 


Glucose    129 H  ()  mg/dL


 


Lactic Acid     (0.5-2.0)  mmol/L


 


Calcium    9.4  (8.6-10.3)  mg/dL


 


Magnesium    1.8 L  (1.9-2.7)  mg/dL


 


Total Bilirubin    0.90  (0.2-1.0)  mg/dL


 


AST    18  (13-39)  U/L


 


ALT    15  (7-52)  U/L


 


Alkaline Phosphatase    53  ()  U/L


 


Ammonia     (16-53)  mcmol/L


 


Total Creatine Kinase    84  ()  U/L


 


CK-MB (CK-2)    1.8  (0.6-6.3)  ng/mL


 


Troponin I    0.02  (<0.04)  ng/mL


 


C-Reactive Protein    52.95 H  (<8.01)  mg/L


 


B-Natriuretic Peptide    ( - 100) pg/mL


 


Total Protein    7.9  (6.4-8.9)  g/dL


 


Albumin    4.4  (3.2-5.2)  g/dL


 


Globulin    3.5  (2-4)  g/dL


 


Albumin/Globulin Ratio    1.3  (1-3)  


 


Lipase    13  (11.0-82.0)  U/L


 


TSH    2.39  (0.34-5.60)  mcIU/mL


 


Urine Color     


 


Urine Appearance     


 


Urine pH     (5-9)  


 


Ur Specific Gravity     (1.010-1.030)  


 


Urine Protein     (Negative)  


 


Urine Ketones     (Negative)  


 


Urine Blood     (Negative)  


 


Urine Nitrate     (Negative)  


 


Urine Bilirubin     (Negative)  


 


Urine Urobilinogen     (Negative)  


 


Ur Leukocyte Esterase     (Negative)  


 


Urine Glucose     (Negative)  


 


Acetaminophen    < 15  mcg/mL


 


Valproic Acid    86.0  ()  mcg/mL


 


Serum Alcohol    < 10  (<10)  mg/dL














  07/07/18 07/07/18 07/07/18 Range/Units





  12:54 12:54 12:54 


 


WBC     (3.5-10.8)  10^3/ul


 


RBC     (4.00-5.40)  10^6/ul


 


Hgb     (12.0-16.0)  g/dl


 


Hct     (35-47)  %


 


MCV     (80-97)  fL


 


MCH     (27-31)  pg


 


MCHC     (31-36)  g/dl


 


RDW     (10.5-15)  %


 


Plt Count     (150-450)  10^3/ul


 


MPV     (7.4-10.4)  um3


 


Neut % (Auto)     (38-83)  %


 


Lymph % (Auto)     (25-47)  %


 


Mono % (Auto)     (0-7)  %


 


Eos % (Auto)     (0-6)  %


 


Baso % (Auto)     (0-2)  %


 


Absolute Neuts (auto)     (1.5-7.7)  10^3/ul


 


Absolute Lymphs (auto)     (1.0-4.8)  10^3/ul


 


Absolute Monos (auto)     (0-0.8)  10^3/ul


 


Absolute Eos (auto)     (0-0.6)  10^3/ul


 


Absolute Basos (auto)     (0-0.2)  10^3/ul


 


Absolute Nucleated RBC     10^3/ul


 


Nucleated RBC %     


 


INR (Anticoag Therapy)     (0.77-1.02)  


 


APTT     (26.0-36.3)  seconds


 


Sodium     (135-145)  mmol/L


 


Potassium     (3.5-5.0)  mmol/L


 


Chloride     (101-111)  mmol/L


 


Carbon Dioxide     (22-32)  mmol/L


 


Anion Gap     (2-11)  mmol/L


 


BUN     (6-24)  mg/dL


 


Creatinine     (0.51-0.95)  mg/dL


 


Est GFR ( Amer)     (>60)  


 


Est GFR (Non-Af Amer)     (>60)  


 


BUN/Creatinine Ratio     (8-20)  


 


Glucose     ()  mg/dL


 


Lactic Acid   1.2   (0.5-2.0)  mmol/L


 


Calcium     (8.6-10.3)  mg/dL


 


Magnesium     (1.9-2.7)  mg/dL


 


Total Bilirubin     (0.2-1.0)  mg/dL


 


AST     (13-39)  U/L


 


ALT     (7-52)  U/L


 


Alkaline Phosphatase     ()  U/L


 


Ammonia  43    (16-53)  mcmol/L


 


Total Creatine Kinase     ()  U/L


 


CK-MB (CK-2)     (0.6-6.3)  ng/mL


 


Troponin I     (<0.04)  ng/mL


 


C-Reactive Protein     (<8.01)  mg/L


 


B-Natriuretic Peptide  145 H   ( - 100) pg/mL


 


Total Protein     (6.4-8.9)  g/dL


 


Albumin     (3.2-5.2)  g/dL


 


Globulin     (2-4)  g/dL


 


Albumin/Globulin Ratio     (1-3)  


 


Lipase     (11.0-82.0)  U/L


 


TSH     (0.34-5.60)  mcIU/mL


 


Urine Color    Yellow  


 


Urine Appearance    Clear  


 


Urine pH    7.0  (5-9)  


 


Ur Specific Gravity    1.014  (1.010-1.030)  


 


Urine Protein    Negative  (Negative)  


 


Urine Ketones    Trace A  (Negative)  


 


Urine Blood    Negative  (Negative)  


 


Urine Nitrate    Negative  (Negative)  


 


Urine Bilirubin    Negative  (Negative)  


 


Urine Urobilinogen    Negative  (Negative)  


 


Ur Leukocyte Esterase    Negative  (Negative)  


 


Urine Glucose    Negative  (Negative)  


 


Acetaminophen     mcg/mL


 


Valproic Acid     ()  mcg/mL


 


Serum Alcohol     (<10)  mg/dL














  07/07/18 Range/Units





  14:03 


 


WBC   (3.5-10.8)  10^3/ul


 


RBC   (4.00-5.40)  10^6/ul


 


Hgb   (12.0-16.0)  g/dl


 


Hct   (35-47)  %


 


MCV   (80-97)  fL


 


MCH   (27-31)  pg


 


MCHC   (31-36)  g/dl


 


RDW   (10.5-15)  %


 


Plt Count   (150-450)  10^3/ul


 


MPV   (7.4-10.4)  um3


 


Neut % (Auto)   (38-83)  %


 


Lymph % (Auto)   (25-47)  %


 


Mono % (Auto)   (0-7)  %


 


Eos % (Auto)   (0-6)  %


 


Baso % (Auto)   (0-2)  %


 


Absolute Neuts (auto)   (1.5-7.7)  10^3/ul


 


Absolute Lymphs (auto)   (1.0-4.8)  10^3/ul


 


Absolute Monos (auto)   (0-0.8)  10^3/ul


 


Absolute Eos (auto)   (0-0.6)  10^3/ul


 


Absolute Basos (auto)   (0-0.2)  10^3/ul


 


Absolute Nucleated RBC   10^3/ul


 


Nucleated RBC %   


 


INR (Anticoag Therapy)   (0.77-1.02)  


 


APTT   (26.0-36.3)  seconds


 


Sodium   (135-145)  mmol/L


 


Potassium   (3.5-5.0)  mmol/L


 


Chloride   (101-111)  mmol/L


 


Carbon Dioxide   (22-32)  mmol/L


 


Anion Gap   (2-11)  mmol/L


 


BUN   (6-24)  mg/dL


 


Creatinine   (0.51-0.95)  mg/dL


 


Est GFR ( Amer)   (>60)  


 


Est GFR (Non-Af Amer)   (>60)  


 


BUN/Creatinine Ratio   (8-20)  


 


Glucose   ()  mg/dL


 


Lactic Acid   (0.5-2.0)  mmol/L


 


Calcium   (8.6-10.3)  mg/dL


 


Magnesium   (1.9-2.7)  mg/dL


 


Total Bilirubin   (0.2-1.0)  mg/dL


 


AST   (13-39)  U/L


 


ALT   (7-52)  U/L


 


Alkaline Phosphatase   ()  U/L


 


Ammonia  29  (16-53)  mcmol/L


 


Total Creatine Kinase   ()  U/L


 


CK-MB (CK-2)   (0.6-6.3)  ng/mL


 


Troponin I   (<0.04)  ng/mL


 


C-Reactive Protein   (<8.01)  mg/L


 


B-Natriuretic Peptide  ( - 100) pg/mL


 


Total Protein   (6.4-8.9)  g/dL


 


Albumin   (3.2-5.2)  g/dL


 


Globulin   (2-4)  g/dL


 


Albumin/Globulin Ratio   (1-3)  


 


Lipase   (11.0-82.0)  U/L


 


TSH   (0.34-5.60)  mcIU/mL


 


Urine Color   


 


Urine Appearance   


 


Urine pH   (5-9)  


 


Ur Specific Gravity   (1.010-1.030)  


 


Urine Protein   (Negative)  


 


Urine Ketones   (Negative)  


 


Urine Blood   (Negative)  


 


Urine Nitrate   (Negative)  


 


Urine Bilirubin   (Negative)  


 


Urine Urobilinogen   (Negative)  


 


Ur Leukocyte Esterase   (Negative)  


 


Urine Glucose   (Negative)  


 


Acetaminophen   mcg/mL


 


Valproic Acid   ()  mcg/mL


 


Serum Alcohol   (<10)  mg/dL











Result Diagrams: 


 07/07/18 12:54





 07/07/18 12:54


Lab Statement: Any lab studies that have been ordered have been reviewed, and 

results considered in the medical decision making process.





- Radiology


  ** CXR


Xray Interpretation: Positive (See Comments) - IMPRESSION:  SMALL BIBASILAR 

INFILTRATES. ED physician reviewed this report.


Radiology Interpretation Completed By: Radiologist





- EKG


  ** 1259


Cardiac Rate: NL - 88 bpm


EKG Rhythm: Sinus Rhythm


ST Segment: Normal


Ectopy: None





Altered Mental Statu Course/Dx





- Course


Course Of Treatment: ADMIT HOSPITALIST.





- Diagnoses


Provider Diagnoses: 


 Pneumonia








- Provider Notifications


Discussed Care Of Patient With: Aurora Enciso


Time Discussed With Above Provider: 15:35


Instructed by Provider To: Other - Accepts for admission





Discharge





- Sign-Out/Discharge


Documenting (check all that apply): Discharge/Admit/Transfer - Admit





- Discharge Plan


Condition: Stable


Disposition: ADMITTED TO [facility]





- Billing Disposition and Condition


Condition: STABLE


Disposition: Admitted to [facility]





The documentation as recorded by the Alex remy Rebecca accurately 

reflects the service I personally performed and the decisions made by me, 

Jorge Bullard MD.

## 2018-07-08 LAB
BASOPHILS # BLD AUTO: 0 10^3/UL (ref 0–0.2)
EOSINOPHIL # BLD AUTO: 0 10^3/UL (ref 0–0.6)
HCT VFR BLD AUTO: 39 % (ref 35–47)
HGB BLD-MCNC: 12.9 G/DL (ref 12–16)
LYMPHOCYTES # BLD AUTO: 1.9 10^3/UL (ref 1–4.8)
MCH RBC QN AUTO: 31 PG (ref 27–31)
MCHC RBC AUTO-ENTMCNC: 34 G/DL (ref 31–36)
MCV RBC AUTO: 93 FL (ref 80–97)
MONOCYTES # BLD AUTO: 1.2 10^3/UL (ref 0–0.8)
NEUTROPHILS # BLD AUTO: 11.8 10^3/UL (ref 1.5–7.7)
NRBC # BLD AUTO: 0 10^3/UL
NRBC BLD QL AUTO: 0
PLATELET # BLD AUTO: 133 10^3/UL (ref 150–450)
RBC # BLD AUTO: 4.15 10^6/UL (ref 4–5.4)
WBC # BLD AUTO: 15 10^3/UL (ref 3.5–10.8)

## 2018-07-08 RX ADMIN — QUETIAPINE SCH: 25 TABLET, FILM COATED ORAL at 21:18

## 2018-07-08 RX ADMIN — Medication SCH: at 12:48

## 2018-07-08 RX ADMIN — DIVALPROEX SODIUM SCH MG: 125 TABLET, DELAYED RELEASE ORAL at 13:50

## 2018-07-08 RX ADMIN — VALPROIC ACID SCH MG: 250 SOLUTION ORAL at 20:59

## 2018-07-08 RX ADMIN — SENNOSIDES SCH TAB: 8.6 TABLET, FILM COATED ORAL at 21:00

## 2018-07-08 RX ADMIN — QUETIAPINE SCH: 25 TABLET, FILM COATED ORAL at 10:23

## 2018-07-08 RX ADMIN — DIVALPROEX SODIUM SCH MG: 125 TABLET, DELAYED RELEASE ORAL at 10:25

## 2018-07-08 RX ADMIN — NYSTATIN SCH APPLIC: 100000 POWDER TOPICAL at 16:55

## 2018-07-08 RX ADMIN — ENOXAPARIN SODIUM SCH MG: 40 INJECTION SUBCUTANEOUS at 18:33

## 2018-07-08 RX ADMIN — BUPROPION HYDROCHLORIDE SCH MG: 100 TABLET ORAL at 18:33

## 2018-07-08 RX ADMIN — ACETAMINOPHEN SCH MG: 650 SOLUTION ORAL at 20:57

## 2018-07-08 RX ADMIN — GUAIFENESIN SCH MG: 600 TABLET, EXTENDED RELEASE ORAL at 10:24

## 2018-07-08 RX ADMIN — GUAIFENESIN SCH MG: 600 TABLET, EXTENDED RELEASE ORAL at 21:00

## 2018-07-08 RX ADMIN — BUPROPION HYDROCHLORIDE SCH: 100 TABLET ORAL at 18:36

## 2018-07-08 RX ADMIN — FERROUS SULFATE TAB 325 MG (65 MG ELEMENTAL FE) SCH MG: 325 (65 FE) TAB at 10:26

## 2018-07-08 RX ADMIN — HALOPERIDOL LACTATE PRN MG: 5 INJECTION, SOLUTION INTRAMUSCULAR at 16:21

## 2018-07-08 RX ADMIN — NYSTATIN SCH APPLIC: 100000 POWDER TOPICAL at 10:34

## 2018-07-08 RX ADMIN — BUPROPION HYDROCHLORIDE SCH MG: 100 TABLET ORAL at 10:25

## 2018-07-08 RX ADMIN — CEFTRIAXONE SODIUM SCH MLS/HR: 1 INJECTION, POWDER, FOR SOLUTION INTRAVENOUS at 16:41

## 2018-07-08 RX ADMIN — ACETAMINOPHEN SCH MG: 325 TABLET ORAL at 10:23

## 2018-07-08 RX ADMIN — BUPROPION HYDROCHLORIDE SCH MG: 100 TABLET ORAL at 13:51

## 2018-07-08 RX ADMIN — FUROSEMIDE SCH MG: 20 TABLET ORAL at 10:26

## 2018-07-08 RX ADMIN — NYSTATIN SCH APPLIC: 100000 POWDER TOPICAL at 21:17

## 2018-07-08 RX ADMIN — AZITHROMYCIN SCH MLS/HR: 500 INJECTION, POWDER, LYOPHILIZED, FOR SOLUTION INTRAVENOUS at 17:37

## 2018-07-08 RX ADMIN — SODIUM CHLORIDE SCH MLS/HR: 900 IRRIGANT IRRIGATION at 09:51

## 2018-07-08 RX ADMIN — LEVOTHYROXINE SODIUM SCH MCG: 50 TABLET ORAL at 06:00

## 2018-07-08 NOTE — PN
Subjective


Date of Service: 07/08/18


Interval History: 





pt woke up to voice, but then started talking to herself, unable to answer any 

questions. denies pain.





Objective


Active Medications: 








Acetaminophen (Tylenol Tab*)  975 mg PO BID [facility]


   Last Admin: 07/08/18 10:23 Dose:  975 mg


Acetaminophen (Tylenol Tab*)  650 mg PO Q6H PRN


   PRN Reason: FEVER/PAIN


Albuterol/Ipratropium (Duoneb (Albuterol 2.5 Mg/Ipratropium 0.5 Mg))  1 neb INH 

Q6H PRN


   PRN Reason: SOB/WHEEZING


Bupropion HCl (Wellbutrin Tab*)  100 mg PO TID WITH MEALS [facility]


   Last Admin: 07/08/18 10:25 Dose:  100 mg


Cholecalciferol (Vitamin D Tab*)  2,000 units PO DAILY@0930 [facility]


Divalproex Sodium (Depakote Dr Tab(*))  375 mg PO TID [facility]


   Last Admin: 07/08/18 10:25 Dose:  375 mg


Enoxaparin Sodium (Lovenox(*))  40 mg SUBCUT Q24H [facility]


   Last Admin: 07/07/18 16:47 Dose:  40 mg


Ferrous Sulfate (Ferrous Sulfate Tab*)  325 mg PO QAM [facility]


   Last Admin: 07/08/18 10:26 Dose:  325 mg


Furosemide (Lasix Tab*)  20 mg PO DAILY [facility]


   Last Admin: 07/08/18 10:26 Dose:  20 mg


Guaifenesin (Mucinex*)  600 mg PO BID [facility]


   Last Admin: 07/08/18 10:24 Dose:  600 mg


Ceftriaxone Sodium 1 gm/ (Sodium Chloride)  50 mls @ 200 mls/hr IVPB Q24H [facility]


   Stop: 07/11/18 15:44


Azithromycin 250 mg/ Sodium (Chloride)  250 mls @ 250 mls/hr IVPB Q24H [facility]


   Stop: 07/11/18 16:59


Sodium Chloride (Ns 0.9% 1000 Ml*)  1,000 mls @ 50 mls/hr IV .PER RATE [facility]


   Last Admin: 07/08/18 09:51 Dose:  50 mls/hr


Levothyroxine Sodium (Synthroid Tab*)  50 mcg PO 0600 [facility]


   Last Admin: 07/08/18 06:00 Dose:  50 mcg


Nystatin (Nystatin Top Powder*)  1 applic TOPICAL TID [facility]


   Last Admin: 07/08/18 10:34 Dose:  1 applic


Ondansetron HCl (Zofran Inj*)  4 mg IV Q4H PRN


   PRN Reason: NAUSEA/VOMITING


   Last Admin: 07/07/18 22:22 Dose:  4 mg


Quetiapine Fumarate (Seroquel Tab*)  75 mg PO BID [facility]


   Last Admin: 07/08/18 10:23 Dose:  Not Given


Senna (Senokot Tab*)  2 tab PO BEDTIME [facility]


   Last Admin: 07/07/18 20:43 Dose:  2 tab








 Vital Signs - 8 hr











  07/08/18 07/08/18 07/08/18





  04:14 07:23 08:00


 


Temperature   98.6 F


 


Pulse Rate 86 85 


 


Respiratory 19 22 





Rate   


 


Blood Pressure 136/64 126/61 





(mmHg)   


 


O2 Sat by Pulse 97 96 





Oximetry   











Oxygen Devices in Use Now: Nasal Cannula


Appearance: 85 yo f in nAD, coughing during the eval, disoriented, talking to 

herself


Eyes: No Scleral Icterus, PERRLA


Ears/Nose/Mouth/Throat: NL Teeth, Lips, Gums, Mucous Membranes Moist


Neck: NL Appearance and Movements; NL JVP, Trachea Midline


Respiratory: Symmetrical Chest Expansion and Respiratory Effort, - - rhonchi b/

l R>L


Cardiovascular: RRR, - - 3/6 MARIA C at apex


Abdominal: NL Sounds; No Tenderness; No Distention


Lymphatic: No Cervical Adenopathy


Extremities: No Edema, No Clubbing, Cyanosis


Skin: No Rash or Ulcers, No Nodules or Sclerosis


Neurological: NL Muscle Strength and Tone


Result Diagrams: 


 07/08/18 07:37





 07/08/18 07:37


Additional Lab and Data: 


 Lab Results











  07/07/18 07/07/18 07/07/18 Range/Units





  12:54 12:54 12:54 


 


WBC  18.1 H    (3.5-10.8)  10^3/ul


 


RBC  4.62    (4.00-5.40)  10^6/ul


 


Hgb  14.6    (12.0-16.0)  g/dl


 


Hct  42    (35-47)  %


 


MCV  91    (80-97)  fL


 


MCH  32 H    (27-31)  pg


 


MCHC  35    (31-36)  g/dl


 


RDW  13    (10.5-15)  %


 


Plt Count  158    (150-450)  10^3/ul


 


MPV  8.6    (7.4-10.4)  um3


 


Neut % (Auto)  84.8 H    (38-83)  %


 


Lymph % (Auto)  7.4 L    (25-47)  %


 


Mono % (Auto)  7.6 H    (0-7)  %


 


Eos % (Auto)  0    (0-6)  %


 


Baso % (Auto)  0.2    (0-2)  %


 


Absolute Neuts (auto)  15.4 H    (1.5-7.7)  10^3/ul


 


Absolute Lymphs (auto)  1.3    (1.0-4.8)  10^3/ul


 


Absolute Monos (auto)  1.4 H    (0-0.8)  10^3/ul


 


Absolute Eos (auto)  0    (0-0.6)  10^3/ul


 


Absolute Basos (auto)  0    (0-0.2)  10^3/ul


 


Absolute Nucleated RBC  0    10^3/ul


 


Nucleated RBC %  0    


 


INR (Anticoag Therapy)   0.94   (0.77-1.02)  


 


APTT   30.7   (26.0-36.3)  seconds


 


Sodium    133 L  (135-145)  mmol/L


 


Potassium    3.9  (3.5-5.0)  mmol/L


 


Chloride    98 L  (101-111)  mmol/L


 


Carbon Dioxide    26  (22-32)  mmol/L


 


Anion Gap    9  (2-11)  mmol/L


 


BUN    11  (6-24)  mg/dL


 


Creatinine    0.95  (0.51-0.95)  mg/dL


 


Est GFR ( Amer)    67.8  (>60)  


 


Est GFR (Non-Af Amer)    56.0  (>60)  


 


BUN/Creatinine Ratio    11.6  (8-20)  


 


Glucose    129 H  ()  mg/dL


 


Lactic Acid     (0.5-2.0)  mmol/L


 


Calcium    9.4  (8.6-10.3)  mg/dL


 


Magnesium    1.8 L  (1.9-2.7)  mg/dL


 


Total Bilirubin    0.90  (0.2-1.0)  mg/dL


 


AST    18  (13-39)  U/L


 


ALT    15  (7-52)  U/L


 


Alkaline Phosphatase    53  ()  U/L


 


Ammonia     (16-53)  mcmol/L


 


Total Creatine Kinase    84  ()  U/L


 


CK-MB (CK-2)    1.8  (0.6-6.3)  ng/mL


 


Troponin I    0.02  (<0.04)  ng/mL


 


C-Reactive Protein    52.95 H  (<8.01)  mg/L


 


B-Natriuretic Peptide    ( - 100) pg/mL


 


Total Protein    7.9  (6.4-8.9)  g/dL


 


Albumin    4.4  (3.2-5.2)  g/dL


 


Globulin    3.5  (2-4)  g/dL


 


Albumin/Globulin Ratio    1.3  (1-3)  


 


Lipase    13  (11.0-82.0)  U/L


 


TSH    2.39  (0.34-5.60)  mcIU/mL


 


Urine Color     


 


Urine Appearance     


 


Urine pH     (5-9)  


 


Ur Specific Gravity     (1.010-1.030)  


 


Urine Protein     (Negative)  


 


Urine Ketones     (Negative)  


 


Urine Blood     (Negative)  


 


Urine Nitrate     (Negative)  


 


Urine Bilirubin     (Negative)  


 


Urine Urobilinogen     (Negative)  


 


Ur Leukocyte Esterase     (Negative)  


 


Urine Glucose     (Negative)  


 


Acetaminophen    < 15  mcg/mL


 


Valproic Acid    86.0  ()  mcg/mL


 


Serum Alcohol    < 10  (<10)  mg/dL














  07/07/18 07/07/18 07/07/18 Range/Units





  12:54 12:54 12:54 


 


WBC     (3.5-10.8)  10^3/ul


 


RBC     (4.00-5.40)  10^6/ul


 


Hgb     (12.0-16.0)  g/dl


 


Hct     (35-47)  %


 


MCV     (80-97)  fL


 


MCH     (27-31)  pg


 


MCHC     (31-36)  g/dl


 


RDW     (10.5-15)  %


 


Plt Count     (150-450)  10^3/ul


 


MPV     (7.4-10.4)  um3


 


Neut % (Auto)     (38-83)  %


 


Lymph % (Auto)     (25-47)  %


 


Mono % (Auto)     (0-7)  %


 


Eos % (Auto)     (0-6)  %


 


Baso % (Auto)     (0-2)  %


 


Absolute Neuts (auto)     (1.5-7.7)  10^3/ul


 


Absolute Lymphs (auto)     (1.0-4.8)  10^3/ul


 


Absolute Monos (auto)     (0-0.8)  10^3/ul


 


Absolute Eos (auto)     (0-0.6)  10^3/ul


 


Absolute Basos (auto)     (0-0.2)  10^3/ul


 


Absolute Nucleated RBC     10^3/ul


 


Nucleated RBC %     


 


INR (Anticoag Therapy)     (0.77-1.02)  


 


APTT     (26.0-36.3)  seconds


 


Sodium     (135-145)  mmol/L


 


Potassium     (3.5-5.0)  mmol/L


 


Chloride     (101-111)  mmol/L


 


Carbon Dioxide     (22-32)  mmol/L


 


Anion Gap     (2-11)  mmol/L


 


BUN     (6-24)  mg/dL


 


Creatinine     (0.51-0.95)  mg/dL


 


Est GFR ( Amer)     (>60)  


 


Est GFR (Non-Af Amer)     (>60)  


 


BUN/Creatinine Ratio     (8-20)  


 


Glucose     ()  mg/dL


 


Lactic Acid   1.2   (0.5-2.0)  mmol/L


 


Calcium     (8.6-10.3)  mg/dL


 


Magnesium     (1.9-2.7)  mg/dL


 


Total Bilirubin     (0.2-1.0)  mg/dL


 


AST     (13-39)  U/L


 


ALT     (7-52)  U/L


 


Alkaline Phosphatase     ()  U/L


 


Ammonia  43    (16-53)  mcmol/L


 


Total Creatine Kinase     ()  U/L


 


CK-MB (CK-2)     (0.6-6.3)  ng/mL


 


Troponin I     (<0.04)  ng/mL


 


C-Reactive Protein     (<8.01)  mg/L


 


B-Natriuretic Peptide  145 H   ( - 100) pg/mL


 


Total Protein     (6.4-8.9)  g/dL


 


Albumin     (3.2-5.2)  g/dL


 


Globulin     (2-4)  g/dL


 


Albumin/Globulin Ratio     (1-3)  


 


Lipase     (11.0-82.0)  U/L


 


TSH     (0.34-5.60)  mcIU/mL


 


Urine Color    Yellow  


 


Urine Appearance    Clear  


 


Urine pH    7.0  (5-9)  


 


Ur Specific Gravity    1.014  (1.010-1.030)  


 


Urine Protein    Negative  (Negative)  


 


Urine Ketones    Trace A  (Negative)  


 


Urine Blood    Negative  (Negative)  


 


Urine Nitrate    Negative  (Negative)  


 


Urine Bilirubin    Negative  (Negative)  


 


Urine Urobilinogen    Negative  (Negative)  


 


Ur Leukocyte Esterase    Negative  (Negative)  


 


Urine Glucose    Negative  (Negative)  


 


Acetaminophen     mcg/mL


 


Valproic Acid     ()  mcg/mL


 


Serum Alcohol     (<10)  mg/dL














  07/07/18 Range/Units





  14:03 


 


WBC   (3.5-10.8)  10^3/ul


 


RBC   (4.00-5.40)  10^6/ul


 


Hgb   (12.0-16.0)  g/dl


 


Hct   (35-47)  %


 


MCV   (80-97)  fL


 


MCH   (27-31)  pg


 


MCHC   (31-36)  g/dl


 


RDW   (10.5-15)  %


 


Plt Count   (150-450)  10^3/ul


 


MPV   (7.4-10.4)  um3


 


Neut % (Auto)   (38-83)  %


 


Lymph % (Auto)   (25-47)  %


 


Mono % (Auto)   (0-7)  %


 


Eos % (Auto)   (0-6)  %


 


Baso % (Auto)   (0-2)  %


 


Absolute Neuts (auto)   (1.5-7.7)  10^3/ul


 


Absolute Lymphs (auto)   (1.0-4.8)  10^3/ul


 


Absolute Monos (auto)   (0-0.8)  10^3/ul


 


Absolute Eos (auto)   (0-0.6)  10^3/ul


 


Absolute Basos (auto)   (0-0.2)  10^3/ul


 


Absolute Nucleated RBC   10^3/ul


 


Nucleated RBC %   


 


INR (Anticoag Therapy)   (0.77-1.02)  


 


APTT   (26.0-36.3)  seconds


 


Sodium   (135-145)  mmol/L


 


Potassium   (3.5-5.0)  mmol/L


 


Chloride   (101-111)  mmol/L


 


Carbon Dioxide   (22-32)  mmol/L


 


Anion Gap   (2-11)  mmol/L


 


BUN   (6-24)  mg/dL


 


Creatinine   (0.51-0.95)  mg/dL


 


Est GFR ( Amer)   (>60)  


 


Est GFR (Non-Af Amer)   (>60)  


 


BUN/Creatinine Ratio   (8-20)  


 


Glucose   ()  mg/dL


 


Lactic Acid   (0.5-2.0)  mmol/L


 


Calcium   (8.6-10.3)  mg/dL


 


Magnesium   (1.9-2.7)  mg/dL


 


Total Bilirubin   (0.2-1.0)  mg/dL


 


AST   (13-39)  U/L


 


ALT   (7-52)  U/L


 


Alkaline Phosphatase   ()  U/L


 


Ammonia  29  (16-53)  mcmol/L


 


Total Creatine Kinase   ()  U/L


 


CK-MB (CK-2)   (0.6-6.3)  ng/mL


 


Troponin I   (<0.04)  ng/mL


 


C-Reactive Protein   (<8.01)  mg/L


 


B-Natriuretic Peptide  ( - 100) pg/mL


 


Total Protein   (6.4-8.9)  g/dL


 


Albumin   (3.2-5.2)  g/dL


 


Globulin   (2-4)  g/dL


 


Albumin/Globulin Ratio   (1-3)  


 


Lipase   (11.0-82.0)  U/L


 


TSH   (0.34-5.60)  mcIU/mL


 


Urine Color   


 


Urine Appearance   


 


Urine pH   (5-9)  


 


Ur Specific Gravity   (1.010-1.030)  


 


Urine Protein   (Negative)  


 


Urine Ketones   (Negative)  


 


Urine Blood   (Negative)  


 


Urine Nitrate   (Negative)  


 


Urine Bilirubin   (Negative)  


 


Urine Urobilinogen   (Negative)  


 


Ur Leukocyte Esterase   (Negative)  


 


Urine Glucose   (Negative)  


 


Acetaminophen   mcg/mL


 


Valproic Acid   ()  mcg/mL


 


Serum Alcohol   (<10)  mg/dL











Microbiology and Other Data: 


 Microbiology











 07/07/18 16:12 Nasal Screen MRSA (PCR) - Final





 Nasal    Mrsa Not Detected














Assess/Plan/Problems-Billing


Assessment: 85 yo F with dementia and hypothyroidism, from [facility] presents 

with lethargy and cough











- Patient Problems


(1) Sepsis


Comment: due to pneumonia


[facility] acquired. Pt is on Ceftriaxone and Azithro with good response: lower 

leukocytosis, afebrile. For now with not broaden the antibiotic coverage since 

pt appears to have good response to tx.


? Aspiration, or pill pocketing noted by RN-swallow eval requested   





(2) Dementia


Comment: 


Continue home medications.


appears to be at baseline   





(3) Hypothyroidism


Comment: 


Continue synthroid   





(4) DVT prophylaxis


Comment: 


Lovenox   


Status and Disposition: 


inpatient

## 2018-07-09 LAB
BASOPHILS # BLD AUTO: 0 10^3/UL (ref 0–0.2)
EOSINOPHIL # BLD AUTO: 0.1 10^3/UL (ref 0–0.6)
HCT VFR BLD AUTO: 36 % (ref 35–47)
HGB BLD-MCNC: 12.2 G/DL (ref 12–16)
LYMPHOCYTES # BLD AUTO: 2.2 10^3/UL (ref 1–4.8)
MCH RBC QN AUTO: 32 PG (ref 27–31)
MCHC RBC AUTO-ENTMCNC: 34 G/DL (ref 31–36)
MCV RBC AUTO: 94 FL (ref 80–97)
MONOCYTES # BLD AUTO: 0.9 10^3/UL (ref 0–0.8)
NEUTROPHILS # BLD AUTO: 8 10^3/UL (ref 1.5–7.7)
NRBC # BLD AUTO: 0 10^3/UL
NRBC BLD QL AUTO: 0
PLATELET # BLD AUTO: 124 10^3/UL (ref 150–450)
RBC # BLD AUTO: 3.81 10^6/UL (ref 4–5.4)
WBC # BLD AUTO: 11.2 10^3/UL (ref 3.5–10.8)

## 2018-07-09 PROCEDURE — 0T9B70Z DRAINAGE OF BLADDER WITH DRAINAGE DEVICE, VIA NATURAL OR ARTIFICIAL OPENING: ICD-10-PCS | Performed by: INTERNAL MEDICINE

## 2018-07-09 RX ADMIN — NYSTATIN SCH APPLIC: 100000 POWDER TOPICAL at 20:57

## 2018-07-09 RX ADMIN — ENOXAPARIN SODIUM SCH MG: 40 INJECTION SUBCUTANEOUS at 17:36

## 2018-07-09 RX ADMIN — FUROSEMIDE SCH MG: 20 TABLET ORAL at 10:20

## 2018-07-09 RX ADMIN — CEFTRIAXONE SODIUM SCH MLS/HR: 1 INJECTION, POWDER, FOR SOLUTION INTRAVENOUS at 16:03

## 2018-07-09 RX ADMIN — QUETIAPINE SCH: 25 TABLET, FILM COATED ORAL at 20:59

## 2018-07-09 RX ADMIN — BUPROPION HYDROCHLORIDE SCH MG: 100 TABLET ORAL at 18:24

## 2018-07-09 RX ADMIN — BUPROPION HYDROCHLORIDE SCH MG: 100 TABLET ORAL at 10:20

## 2018-07-09 RX ADMIN — BUPROPION HYDROCHLORIDE SCH: 100 TABLET ORAL at 12:08

## 2018-07-09 RX ADMIN — Medication SCH: at 12:59

## 2018-07-09 RX ADMIN — ACETAMINOPHEN SCH: 650 SOLUTION ORAL at 12:08

## 2018-07-09 RX ADMIN — HALOPERIDOL LACTATE PRN MG: 5 INJECTION, SOLUTION INTRAMUSCULAR at 15:37

## 2018-07-09 RX ADMIN — BUPROPION HYDROCHLORIDE SCH: 100 TABLET ORAL at 18:08

## 2018-07-09 RX ADMIN — ACETAMINOPHEN SCH: 650 SOLUTION ORAL at 20:59

## 2018-07-09 RX ADMIN — VALPROIC ACID SCH: 250 SOLUTION ORAL at 21:00

## 2018-07-09 RX ADMIN — VALPROIC ACID SCH: 250 SOLUTION ORAL at 12:59

## 2018-07-09 RX ADMIN — LEVOTHYROXINE SODIUM SCH: 50 TABLET ORAL at 05:42

## 2018-07-09 RX ADMIN — QUETIAPINE SCH: 25 TABLET, FILM COATED ORAL at 12:08

## 2018-07-09 RX ADMIN — QUETIAPINE SCH MG: 25 TABLET, FILM COATED ORAL at 10:20

## 2018-07-09 RX ADMIN — GUAIFENESIN SCH: 600 TABLET, EXTENDED RELEASE ORAL at 12:08

## 2018-07-09 RX ADMIN — AZITHROMYCIN SCH MLS/HR: 500 INJECTION, POWDER, LYOPHILIZED, FOR SOLUTION INTRAVENOUS at 17:36

## 2018-07-09 RX ADMIN — NYSTATIN SCH APPLIC: 100000 POWDER TOPICAL at 18:24

## 2018-07-09 RX ADMIN — BUPROPION HYDROCHLORIDE SCH MG: 100 TABLET ORAL at 17:42

## 2018-07-09 RX ADMIN — HALOPERIDOL LACTATE PRN MG: 5 INJECTION, SOLUTION INTRAMUSCULAR at 23:45

## 2018-07-09 RX ADMIN — LEVOTHYROXINE SODIUM SCH MCG: 50 TABLET ORAL at 05:33

## 2018-07-09 RX ADMIN — GUAIFENESIN SCH: 600 TABLET, EXTENDED RELEASE ORAL at 20:59

## 2018-07-09 RX ADMIN — SENNOSIDES SCH: 8.6 TABLET, FILM COATED ORAL at 20:59

## 2018-07-09 RX ADMIN — FERROUS SULFATE TAB 325 MG (65 MG ELEMENTAL FE) SCH: 325 (65 FE) TAB at 12:08

## 2018-07-09 RX ADMIN — FERROUS SULFATE TAB 325 MG (65 MG ELEMENTAL FE) SCH MG: 325 (65 FE) TAB at 10:20

## 2018-07-09 RX ADMIN — FUROSEMIDE SCH: 20 TABLET ORAL at 12:08

## 2018-07-09 RX ADMIN — GUAIFENESIN SCH MG: 600 TABLET, EXTENDED RELEASE ORAL at 10:21

## 2018-07-09 RX ADMIN — HALOPERIDOL LACTATE PRN MG: 5 INJECTION, SOLUTION INTRAMUSCULAR at 04:21

## 2018-07-09 RX ADMIN — VALPROIC ACID SCH: 250 SOLUTION ORAL at 15:19

## 2018-07-09 NOTE — PN
Subjective


Date of Service: 07/09/18


Interval History: 





Pt has had high urine residuals and needed straight caht.


Also had been refusing meds


Talking to herself ,  sometimes incomprehensibly





Objective


Active Medications: 








Acetaminophen (Tylenol Adult Liq*)  975 mg PO BID [facility]


   Last Admin: 07/08/18 20:57 Dose:  975 mg


Acetaminophen (Tylenol Adult Liq*)  650 mg PO Q6H PRN


   PRN Reason: FEVER/PAIN


Albuterol/Ipratropium (Duoneb (Albuterol 2.5 Mg/Ipratropium 0.5 Mg))  1 neb INH 

Q6H PRN


   PRN Reason: SOB/WHEEZING


Bupropion HCl (Wellbutrin Tab*)  100 mg PO TID WITH MEALS [facility]


   Last Admin: 07/08/18 18:36 Dose:  Not Given


Cholecalciferol (Vitamin D Tab*)  2,000 units PO DAILY@0930 [facility]


   Last Admin: 07/08/18 12:48 Dose:  Not Given


Enoxaparin Sodium (Lovenox(*))  40 mg SUBCUT Q24H [facility]


   Last Admin: 07/08/18 18:33 Dose:  40 mg


Ferrous Sulfate (Ferrous Sulfate Tab*)  325 mg PO QAM [facility]


   Last Admin: 07/08/18 10:26 Dose:  325 mg


Furosemide (Lasix Tab*)  20 mg PO DAILY [facility]


   Last Admin: 07/08/18 10:26 Dose:  20 mg


Guaifenesin (Mucinex*)  600 mg PO BID [facility]


   Last Admin: 07/08/18 21:00 Dose:  600 mg


Haloperidol Lactate (Haldol Inj Iv/Im*)  2 mg IV SLOW PU Q6H PRN


   PRN Reason: AGITATION


   Last Admin: 07/09/18 04:21 Dose:  2 mg


Ceftriaxone Sodium 1 gm/ (Sodium Chloride)  50 mls @ 200 mls/hr IVPB Q24H [facility]


   Stop: 07/11/18 15:44


   Last Admin: 07/08/18 16:41 Dose:  200 mls/hr


Azithromycin 250 mg/ Sodium (Chloride)  250 mls @ 250 mls/hr IVPB Q24H [facility]


   Stop: 07/11/18 16:59


   Last Admin: 07/08/18 17:37 Dose:  250 mls/hr


Sodium Chloride (Ns 0.9% 1000 Ml*)  1,000 mls @ 50 mls/hr IV .PER RATE [facility]


   Last Admin: 07/08/18 09:51 Dose:  50 mls/hr


Levothyroxine Sodium (Synthroid Tab*)  50 mcg PO 0600 [facility]


   Last Admin: 07/09/18 05:42 Dose:  Not Given


Nystatin (Nystatin Top Powder*)  1 applic TOPICAL TID [facility]


   Last Admin: 07/08/18 21:17 Dose:  1 applic


Ondansetron HCl (Zofran Inj*)  4 mg IV Q4H PRN


   PRN Reason: NAUSEA/VOMITING


   Last Admin: 07/07/18 22:22 Dose:  4 mg


Quetiapine Fumarate (Seroquel Tab*)  75 mg PO BID [facility]


   Last Admin: 07/08/18 21:18 Dose:  Not Given


Senna (Senokot Tab*)  2 tab PO BEDTIME [facility]


   Last Admin: 07/08/18 21:00 Dose:  2 tab


Valproic Acid (Depakene Liq(*))  375 mg PO TID [facility]


   Last Admin: 07/08/18 20:59 Dose:  375 mg








 Vital Signs - 8 hr











  07/09/18 07/09/18 07/09/18





  03:20 07:46 07:48


 


Temperature 98.3 F  


 


Pulse Rate 74  74


 


Respiratory 16  18





Rate   


 


Blood Pressure 116/67  138/75





(mmHg)   


 


O2 Sat by Pulse 95 97 97





Oximetry   











Oxygen Devices in Use Now: None


Appearance: 85 yo f in nAD, not following commands, disoriented


Eyes: No Scleral Icterus, PERRLA


Ears/Nose/Mouth/Throat: NL Teeth, Lips, Gums, Mucous Membranes Moist


Neck: NL Appearance and Movements; NL JVP, Trachea Midline


Respiratory: Symmetrical Chest Expansion and Respiratory Effort, - - crackles 

at b/l bases


Cardiovascular: NL Sounds; No Murmurs; No JVD, RRR


Abdominal: NL Sounds; No Tenderness; No Distention, No Hepatosplenomegaly


Lymphatic: No Cervical Adenopathy


Extremities: No Edema, No Clubbing, Cyanosis


Skin: No Rash or Ulcers, No Nodules or Sclerosis


Neurological: NL Muscle Strength and Tone


Result Diagrams: 


 07/09/18 05:55





 07/09/18 05:55


Additional Lab and Data: 


 Lab Results











  07/07/18 07/07/18 07/07/18 Range/Units





  12:54 12:54 12:54 


 


WBC  18.1 H    (3.5-10.8)  10^3/ul


 


RBC  4.62    (4.00-5.40)  10^6/ul


 


Hgb  14.6    (12.0-16.0)  g/dl


 


Hct  42    (35-47)  %


 


MCV  91    (80-97)  fL


 


MCH  32 H    (27-31)  pg


 


MCHC  35    (31-36)  g/dl


 


RDW  13    (10.5-15)  %


 


Plt Count  158    (150-450)  10^3/ul


 


MPV  8.6    (7.4-10.4)  um3


 


Neut % (Auto)  84.8 H    (38-83)  %


 


Lymph % (Auto)  7.4 L    (25-47)  %


 


Mono % (Auto)  7.6 H    (0-7)  %


 


Eos % (Auto)  0    (0-6)  %


 


Baso % (Auto)  0.2    (0-2)  %


 


Absolute Neuts (auto)  15.4 H    (1.5-7.7)  10^3/ul


 


Absolute Lymphs (auto)  1.3    (1.0-4.8)  10^3/ul


 


Absolute Monos (auto)  1.4 H    (0-0.8)  10^3/ul


 


Absolute Eos (auto)  0    (0-0.6)  10^3/ul


 


Absolute Basos (auto)  0    (0-0.2)  10^3/ul


 


Absolute Nucleated RBC  0    10^3/ul


 


Nucleated RBC %  0    


 


INR (Anticoag Therapy)   0.94   (0.77-1.02)  


 


APTT   30.7   (26.0-36.3)  seconds


 


Sodium    133 L  (135-145)  mmol/L


 


Potassium    3.9  (3.5-5.0)  mmol/L


 


Chloride    98 L  (101-111)  mmol/L


 


Carbon Dioxide    26  (22-32)  mmol/L


 


Anion Gap    9  (2-11)  mmol/L


 


BUN    11  (6-24)  mg/dL


 


Creatinine    0.95  (0.51-0.95)  mg/dL


 


Est GFR ( Amer)    67.8  (>60)  


 


Est GFR (Non-Af Amer)    56.0  (>60)  


 


BUN/Creatinine Ratio    11.6  (8-20)  


 


Glucose    129 H  ()  mg/dL


 


Lactic Acid     (0.5-2.0)  mmol/L


 


Calcium    9.4  (8.6-10.3)  mg/dL


 


Magnesium    1.8 L  (1.9-2.7)  mg/dL


 


Total Bilirubin    0.90  (0.2-1.0)  mg/dL


 


AST    18  (13-39)  U/L


 


ALT    15  (7-52)  U/L


 


Alkaline Phosphatase    53  ()  U/L


 


Ammonia     (16-53)  mcmol/L


 


Total Creatine Kinase    84  ()  U/L


 


CK-MB (CK-2)    1.8  (0.6-6.3)  ng/mL


 


Troponin I    0.02  (<0.04)  ng/mL


 


C-Reactive Protein    52.95 H  (<8.01)  mg/L


 


B-Natriuretic Peptide    ( - 100) pg/mL


 


Total Protein    7.9  (6.4-8.9)  g/dL


 


Albumin    4.4  (3.2-5.2)  g/dL


 


Globulin    3.5  (2-4)  g/dL


 


Albumin/Globulin Ratio    1.3  (1-3)  


 


Lipase    13  (11.0-82.0)  U/L


 


TSH    2.39  (0.34-5.60)  mcIU/mL


 


Urine Color     


 


Urine Appearance     


 


Urine pH     (5-9)  


 


Ur Specific Gravity     (1.010-1.030)  


 


Urine Protein     (Negative)  


 


Urine Ketones     (Negative)  


 


Urine Blood     (Negative)  


 


Urine Nitrate     (Negative)  


 


Urine Bilirubin     (Negative)  


 


Urine Urobilinogen     (Negative)  


 


Ur Leukocyte Esterase     (Negative)  


 


Urine Glucose     (Negative)  


 


Acetaminophen    < 15  mcg/mL


 


Valproic Acid    86.0  ()  mcg/mL


 


Serum Alcohol    < 10  (<10)  mg/dL














  07/07/18 07/07/18 07/07/18 Range/Units





  12:54 12:54 12:54 


 


WBC     (3.5-10.8)  10^3/ul


 


RBC     (4.00-5.40)  10^6/ul


 


Hgb     (12.0-16.0)  g/dl


 


Hct     (35-47)  %


 


MCV     (80-97)  fL


 


MCH     (27-31)  pg


 


MCHC     (31-36)  g/dl


 


RDW     (10.5-15)  %


 


Plt Count     (150-450)  10^3/ul


 


MPV     (7.4-10.4)  um3


 


Neut % (Auto)     (38-83)  %


 


Lymph % (Auto)     (25-47)  %


 


Mono % (Auto)     (0-7)  %


 


Eos % (Auto)     (0-6)  %


 


Baso % (Auto)     (0-2)  %


 


Absolute Neuts (auto)     (1.5-7.7)  10^3/ul


 


Absolute Lymphs (auto)     (1.0-4.8)  10^3/ul


 


Absolute Monos (auto)     (0-0.8)  10^3/ul


 


Absolute Eos (auto)     (0-0.6)  10^3/ul


 


Absolute Basos (auto)     (0-0.2)  10^3/ul


 


Absolute Nucleated RBC     10^3/ul


 


Nucleated RBC %     


 


INR (Anticoag Therapy)     (0.77-1.02)  


 


APTT     (26.0-36.3)  seconds


 


Sodium     (135-145)  mmol/L


 


Potassium     (3.5-5.0)  mmol/L


 


Chloride     (101-111)  mmol/L


 


Carbon Dioxide     (22-32)  mmol/L


 


Anion Gap     (2-11)  mmol/L


 


BUN     (6-24)  mg/dL


 


Creatinine     (0.51-0.95)  mg/dL


 


Est GFR ( Amer)     (>60)  


 


Est GFR (Non-Af Amer)     (>60)  


 


BUN/Creatinine Ratio     (8-20)  


 


Glucose     ()  mg/dL


 


Lactic Acid   1.2   (0.5-2.0)  mmol/L


 


Calcium     (8.6-10.3)  mg/dL


 


Magnesium     (1.9-2.7)  mg/dL


 


Total Bilirubin     (0.2-1.0)  mg/dL


 


AST     (13-39)  U/L


 


ALT     (7-52)  U/L


 


Alkaline Phosphatase     ()  U/L


 


Ammonia  43    (16-53)  mcmol/L


 


Total Creatine Kinase     ()  U/L


 


CK-MB (CK-2)     (0.6-6.3)  ng/mL


 


Troponin I     (<0.04)  ng/mL


 


C-Reactive Protein     (<8.01)  mg/L


 


B-Natriuretic Peptide  145 H   ( - 100) pg/mL


 


Total Protein     (6.4-8.9)  g/dL


 


Albumin     (3.2-5.2)  g/dL


 


Globulin     (2-4)  g/dL


 


Albumin/Globulin Ratio     (1-3)  


 


Lipase     (11.0-82.0)  U/L


 


TSH     (0.34-5.60)  mcIU/mL


 


Urine Color    Yellow  


 


Urine Appearance    Clear  


 


Urine pH    7.0  (5-9)  


 


Ur Specific Gravity    1.014  (1.010-1.030)  


 


Urine Protein    Negative  (Negative)  


 


Urine Ketones    Trace A  (Negative)  


 


Urine Blood    Negative  (Negative)  


 


Urine Nitrate    Negative  (Negative)  


 


Urine Bilirubin    Negative  (Negative)  


 


Urine Urobilinogen    Negative  (Negative)  


 


Ur Leukocyte Esterase    Negative  (Negative)  


 


Urine Glucose    Negative  (Negative)  


 


Acetaminophen     mcg/mL


 


Valproic Acid     ()  mcg/mL


 


Serum Alcohol     (<10)  mg/dL














  07/07/18 Range/Units





  14:03 


 


WBC   (3.5-10.8)  10^3/ul


 


RBC   (4.00-5.40)  10^6/ul


 


Hgb   (12.0-16.0)  g/dl


 


Hct   (35-47)  %


 


MCV   (80-97)  fL


 


MCH   (27-31)  pg


 


MCHC   (31-36)  g/dl


 


RDW   (10.5-15)  %


 


Plt Count   (150-450)  10^3/ul


 


MPV   (7.4-10.4)  um3


 


Neut % (Auto)   (38-83)  %


 


Lymph % (Auto)   (25-47)  %


 


Mono % (Auto)   (0-7)  %


 


Eos % (Auto)   (0-6)  %


 


Baso % (Auto)   (0-2)  %


 


Absolute Neuts (auto)   (1.5-7.7)  10^3/ul


 


Absolute Lymphs (auto)   (1.0-4.8)  10^3/ul


 


Absolute Monos (auto)   (0-0.8)  10^3/ul


 


Absolute Eos (auto)   (0-0.6)  10^3/ul


 


Absolute Basos (auto)   (0-0.2)  10^3/ul


 


Absolute Nucleated RBC   10^3/ul


 


Nucleated RBC %   


 


INR (Anticoag Therapy)   (0.77-1.02)  


 


APTT   (26.0-36.3)  seconds


 


Sodium   (135-145)  mmol/L


 


Potassium   (3.5-5.0)  mmol/L


 


Chloride   (101-111)  mmol/L


 


Carbon Dioxide   (22-32)  mmol/L


 


Anion Gap   (2-11)  mmol/L


 


BUN   (6-24)  mg/dL


 


Creatinine   (0.51-0.95)  mg/dL


 


Est GFR ( Amer)   (>60)  


 


Est GFR (Non-Af Amer)   (>60)  


 


BUN/Creatinine Ratio   (8-20)  


 


Glucose   ()  mg/dL


 


Lactic Acid   (0.5-2.0)  mmol/L


 


Calcium   (8.6-10.3)  mg/dL


 


Magnesium   (1.9-2.7)  mg/dL


 


Total Bilirubin   (0.2-1.0)  mg/dL


 


AST   (13-39)  U/L


 


ALT   (7-52)  U/L


 


Alkaline Phosphatase   ()  U/L


 


Ammonia  29  (16-53)  mcmol/L


 


Total Creatine Kinase   ()  U/L


 


CK-MB (CK-2)   (0.6-6.3)  ng/mL


 


Troponin I   (<0.04)  ng/mL


 


C-Reactive Protein   (<8.01)  mg/L


 


B-Natriuretic Peptide  ( - 100) pg/mL


 


Total Protein   (6.4-8.9)  g/dL


 


Albumin   (3.2-5.2)  g/dL


 


Globulin   (2-4)  g/dL


 


Albumin/Globulin Ratio   (1-3)  


 


Lipase   (11.0-82.0)  U/L


 


TSH   (0.34-5.60)  mcIU/mL


 


Urine Color   


 


Urine Appearance   


 


Urine pH   (5-9)  


 


Ur Specific Gravity   (1.010-1.030)  


 


Urine Protein   (Negative)  


 


Urine Ketones   (Negative)  


 


Urine Blood   (Negative)  


 


Urine Nitrate   (Negative)  


 


Urine Bilirubin   (Negative)  


 


Urine Urobilinogen   (Negative)  


 


Ur Leukocyte Esterase   (Negative)  


 


Urine Glucose   (Negative)  


 


Acetaminophen   mcg/mL


 


Valproic Acid   ()  mcg/mL


 


Serum Alcohol   (<10)  mg/dL











Microbiology and Other Data: 


 Microbiology











 07/07/18 16:12 Nasal Screen MRSA (PCR) - Final





 Nasal    Mrsa Not Detected














Assess/Plan/Problems-Billing


Assessment: 85 yo F with dementia and hypothyroidism, from [facility] presents 

with lethargy and cough











- Patient Problems


(1) Sepsis


Comment: due to pneumonia


Healthcare acquired. Pt is on Ceftriaxone and Azithro with good response: lower 

leukocytosis, afebrile. 


? Aspiration, or pill pocketing noted by RN-swallow eval requested   





(2) Dementia


Comment: 


Continue home medications.


appears to be at baseline, occasionally combative and refusing tx. Would 

benefit from d/c back to familiar environment as soon as possible   





(3) Hypothyroidism


Comment: 


Continue synthroid   





(4) DVT prophylaxis


Comment: 


Lovenox   


Status and Disposition: 


inpatient

## 2018-07-10 VITALS — DIASTOLIC BLOOD PRESSURE: 96 MMHG | SYSTOLIC BLOOD PRESSURE: 169 MMHG

## 2018-07-10 RX ADMIN — NYSTATIN SCH APPLIC: 100000 POWDER TOPICAL at 09:58

## 2018-07-10 RX ADMIN — VALPROIC ACID SCH MG: 250 SOLUTION ORAL at 09:48

## 2018-07-10 RX ADMIN — LEVOTHYROXINE SODIUM SCH: 50 TABLET ORAL at 05:28

## 2018-07-10 RX ADMIN — QUETIAPINE SCH: 25 TABLET, FILM COATED ORAL at 10:06

## 2018-07-10 RX ADMIN — FERROUS SULFATE TAB 325 MG (65 MG ELEMENTAL FE) SCH: 325 (65 FE) TAB at 11:21

## 2018-07-10 RX ADMIN — Medication SCH: at 11:22

## 2018-07-10 RX ADMIN — ACETAMINOPHEN SCH: 650 SOLUTION ORAL at 11:21

## 2018-07-10 RX ADMIN — BUPROPION HYDROCHLORIDE SCH: 100 TABLET ORAL at 13:48

## 2018-07-10 RX ADMIN — FUROSEMIDE SCH MG: 20 TABLET ORAL at 09:54

## 2018-07-10 RX ADMIN — BUPROPION HYDROCHLORIDE SCH: 100 TABLET ORAL at 10:05

## 2018-07-10 RX ADMIN — GUAIFENESIN SCH: 600 TABLET, EXTENDED RELEASE ORAL at 11:22

## 2018-07-10 RX ADMIN — SODIUM CHLORIDE SCH MLS/HR: 900 IRRIGANT IRRIGATION at 09:46

## 2018-07-10 NOTE — DS
CC:  Primary Care Provider at [facility]

 

DISCHARGE SUMMARY:

 

DATE OF ADMISSION:  07/07/18

 

DATE OF DISCHARGE:  07/10/18

 

PRIMARY CARE PROVIDER:  Provider from [facility].

 

DISCHARGE DIAGNOSIS:  Sepsis due to healthcare-associated pneumonia.

 

SECONDARY DIAGNOSES:

1.  Dementia with episodes of agitation and behavioral disturbances.

2.  History of hypothyroidism.

3.  Hypertension.

4.  Depression with anxiety.

5.  History of coronary artery disease.

6.  History of vitamin B12 deficiency.

7.  History of constipation.

8.  History of psychiatric disorder with history of delusions.

9.  History of exfoliative dermatitis.

 

MEDICATIONS AT DISCHARGE:  Include:

 

1.  Azithromycin 250 mg p.o. daily for 2 days total.

2.  Cefdinir 300 mg p.o. b.i.d. for 4 days total.

 

The remaining medications are unchanged and include:

 

1.  Acetaminophen on a p.r.n. basis.

2.  DuoNeb nebulizer on a p.r.n. basis.

3.  Wellbutrin 100 mg 3 times a day.

4.  Vitamin D3, 2000 units daily.

5.  Vitamin B12, 1000 mcg IM monthly.

6.  Ferrous sulfate 325 mg daily.

7.  Levothyroxine 50 mcg daily.

8.  Seroquel 75 mg b.i.d.

9.  Senokot 2 tablets at bedtime.

10.  Desyrel 25 mg at bedtime.

11.  Furosemide 20 mg daily.

12.  Nystatin topical powder apply to affected areas t.i.d.

 

LABORATORY DATA AND STUDIES PERFORMED DURING THE HOSPITAL STAY:  Included:

 

On 07/09/18, white blood cell count of 11.2, hemoglobin of 12.2, hematocrit of 36, and platelets of 1
24.

 

Sodium was 138, potassium 4.3, chloride 104, carbon dioxide 28, BUN 15, creatinine 1.14.

 

Microbiology tests showed negative Strep pneumo and Legionella antigens in the urine.  Blood cultures
 were negative to date.

 

Portable chest x-ray, impression:  "Small bibasilar infiltrates."

 

HOSPITALIZATION COURSE:  Sheela Cash is an 84-year-old female with history of dementia with behaviora
l disturbances, who presented from [facility] with altered mental status, leukocytosis an
d fevers.  The patient was diagnosed with healthcare-associated pneumonia.  She was mildly encephalop
athic at admission and her lethargy resolved with intravenous fluid hydration as well as antibiotics.
 The patient was originally placed not on healthcare-associated pneumonia treatment with azithromycin
 and ceftriaxone, but despite that, she did very well for the initial 24 hours that is why her treatm
ent was not changed.  During the hospital stay, she had a problem with urinary retention and transien
tly Miller catheter was placed in.  The patient did not tolerate the Miller and kept on pulling on it. 
 Due to that, the patient's Miller was discontinued on the day of discharge.  She had a large bowel mo
vement and she urinated several times prior to discharge.

 

There was a question of patient pocketing pills and swallow evaluation was attempted.  Unfortunately,
 the patient is intermittently combative, not following commands, not understanding commands, and not
 cooperating with exam.  She had been eating regularly during the hospital stay, but was placed on me
chanical ground diet and that is the recommendation for the patient to go home on.  There was no evid
ence of aspiration proven during the hospital stay.

 

During the hospital stay, the patient would get agitated every night and received doses of Haldol on 
a p.r.n. basis.

 

The discussions were carried out with the patient's daughter in regards to the patient's code status.
  The patient's daughter continues to request for the patient to be a full code, but I encouraged for
 the patient's daughter, Paieg, to continue to discuss code status with the patient's primary care
 provider at [facility].

 

PHYSICAL EXAMINATION:  At the time of discharge, blood pressure of 169/96, heart rate of 87 and regul
ar, respiratory rate 16, oxygen saturation 93% on room air, temperature 97.9.  General:  The patient 
is a very pleasant 84-year-old female, who occasionally is able to speak clearly, but does not respon
d to commands and does not follow commands.  She is not able to carry on a conversation.  Most of the
 time, she murmurs to herself incomprehensively during the evaluation.  HEENT: Head:  Atraumatic, nor
mocephalic.  Eyes:  Pupils are equal, reactive to light and accommodation.  Oropharynx is clear.  Muc
bright moist.  Neck:  Supple.  No JVD.  No bruits bilaterally.  Cardiovascular:  Regular rate and rhythm
.  No murmur. Respiratory:  Clear to auscultation bilaterally.  Abdomen:  Soft, nontender.  Bowel kel
nds are present in all 4 quadrants.  Extremities:  There is trace bilateral pedal edema.  Pulses are 
+2 bilaterally.  There is no clubbing or cyanosis.  Neuro Evaluation:  Speech is clear.  Cranial nerv
es II through XII are grossly intact. Motor strength is 5/5 bilaterally.

 

Please note that this is a short summary of the patient's hospitalization.  Please refer to further m
edical records for details.

 

TIME SPENT:  Approximately 45 minutes were spent on the patient's discharge.

 

 [id number]/CPS #: [id number]